# Patient Record
Sex: FEMALE | Race: WHITE | NOT HISPANIC OR LATINO | Employment: OTHER
[De-identification: names, ages, dates, MRNs, and addresses within clinical notes are randomized per-mention and may not be internally consistent; named-entity substitution may affect disease eponyms.]

---

## 2017-06-10 ENCOUNTER — HEALTH MAINTENANCE LETTER (OUTPATIENT)
Age: 57
End: 2017-06-10

## 2023-05-22 ASSESSMENT — ANXIETY QUESTIONNAIRES
GAD7 TOTAL SCORE: 4
IF YOU CHECKED OFF ANY PROBLEMS ON THIS QUESTIONNAIRE, HOW DIFFICULT HAVE THESE PROBLEMS MADE IT FOR YOU TO DO YOUR WORK, TAKE CARE OF THINGS AT HOME, OR GET ALONG WITH OTHER PEOPLE: NOT DIFFICULT AT ALL
GAD7 TOTAL SCORE: 4
2. NOT BEING ABLE TO STOP OR CONTROL WORRYING: NOT AT ALL
8. IF YOU CHECKED OFF ANY PROBLEMS, HOW DIFFICULT HAVE THESE MADE IT FOR YOU TO DO YOUR WORK, TAKE CARE OF THINGS AT HOME, OR GET ALONG WITH OTHER PEOPLE?: NOT DIFFICULT AT ALL
GAD7 TOTAL SCORE: 4
4. TROUBLE RELAXING: SEVERAL DAYS
7. FEELING AFRAID AS IF SOMETHING AWFUL MIGHT HAPPEN: NOT AT ALL
5. BEING SO RESTLESS THAT IT IS HARD TO SIT STILL: NOT AT ALL
6. BECOMING EASILY ANNOYED OR IRRITABLE: SEVERAL DAYS
3. WORRYING TOO MUCH ABOUT DIFFERENT THINGS: SEVERAL DAYS
1. FEELING NERVOUS, ANXIOUS, OR ON EDGE: SEVERAL DAYS
7. FEELING AFRAID AS IF SOMETHING AWFUL MIGHT HAPPEN: NOT AT ALL

## 2023-05-23 ENCOUNTER — TELEPHONE (OUTPATIENT)
Dept: FAMILY MEDICINE | Facility: CLINIC | Age: 63
End: 2023-05-23

## 2023-05-23 ENCOUNTER — VIRTUAL VISIT (OUTPATIENT)
Dept: FAMILY MEDICINE | Facility: CLINIC | Age: 63
End: 2023-05-23
Payer: COMMERCIAL

## 2023-05-23 DIAGNOSIS — E78.5 HYPERLIPIDEMIA LDL GOAL <70: ICD-10-CM

## 2023-05-23 DIAGNOSIS — F33.1 MODERATE EPISODE OF RECURRENT MAJOR DEPRESSIVE DISORDER (H): ICD-10-CM

## 2023-05-23 DIAGNOSIS — M81.6 LOCALIZED OSTEOPOROSIS WITHOUT CURRENT PATHOLOGICAL FRACTURE: ICD-10-CM

## 2023-05-23 DIAGNOSIS — I10 PRIMARY HYPERTENSION: ICD-10-CM

## 2023-05-23 DIAGNOSIS — E03.9 HYPOTHYROIDISM, UNSPECIFIED TYPE: Primary | ICD-10-CM

## 2023-05-23 DIAGNOSIS — E11.9 TYPE 2 DIABETES MELLITUS WITHOUT COMPLICATION, WITHOUT LONG-TERM CURRENT USE OF INSULIN (H): ICD-10-CM

## 2023-05-23 DIAGNOSIS — F41.1 GAD (GENERALIZED ANXIETY DISORDER): ICD-10-CM

## 2023-05-23 PROCEDURE — 99204 OFFICE O/P NEW MOD 45 MIN: CPT | Mod: VID | Performed by: INTERNAL MEDICINE

## 2023-05-23 RX ORDER — METFORMIN HCL 500 MG
500 TABLET, EXTENDED RELEASE 24 HR ORAL
Qty: 90 TABLET | Refills: 3 | Status: SHIPPED | OUTPATIENT
Start: 2023-05-23 | End: 2024-05-22

## 2023-05-23 RX ORDER — LANCETS
EACH MISCELLANEOUS
Qty: 100 EACH | Refills: 6 | Status: SHIPPED | OUTPATIENT
Start: 2023-05-23

## 2023-05-23 RX ORDER — LISINOPRIL/HYDROCHLOROTHIAZIDE 10-12.5 MG
1 TABLET ORAL DAILY
Qty: 90 TABLET | Refills: 3 | Status: SHIPPED | OUTPATIENT
Start: 2023-05-23 | End: 2024-05-22

## 2023-05-23 RX ORDER — CITALOPRAM HYDROBROMIDE 40 MG/1
40 TABLET ORAL DAILY
COMMUNITY
End: 2023-05-23

## 2023-05-23 RX ORDER — IBANDRONATE SODIUM 150 MG/1
150 TABLET, FILM COATED ORAL
Qty: 12 TABLET | Refills: 1 | Status: SHIPPED | OUTPATIENT
Start: 2023-05-23 | End: 2024-05-22

## 2023-05-23 RX ORDER — LEVOTHYROXINE SODIUM 100 UG/1
100 TABLET ORAL DAILY
Qty: 90 TABLET | Refills: 3 | Status: SHIPPED | OUTPATIENT
Start: 2023-05-23 | End: 2024-05-22

## 2023-05-23 RX ORDER — ATORVASTATIN CALCIUM 10 MG/1
10 TABLET, FILM COATED ORAL DAILY
Qty: 90 TABLET | Refills: 3 | Status: SHIPPED | OUTPATIENT
Start: 2023-05-23 | End: 2024-05-22

## 2023-05-23 RX ORDER — ATORVASTATIN CALCIUM 10 MG/1
10 TABLET, FILM COATED ORAL DAILY
COMMUNITY
End: 2023-05-23

## 2023-05-23 RX ORDER — METFORMIN HCL 500 MG
500 TABLET, EXTENDED RELEASE 24 HR ORAL
COMMUNITY
End: 2023-05-23

## 2023-05-23 RX ORDER — IBANDRONATE SODIUM 150 MG/1
150 TABLET, FILM COATED ORAL
COMMUNITY
End: 2023-05-23

## 2023-05-23 RX ORDER — CITALOPRAM HYDROBROMIDE 40 MG/1
40 TABLET ORAL DAILY
Qty: 90 TABLET | Refills: 3 | Status: SHIPPED | OUTPATIENT
Start: 2023-05-23 | End: 2024-09-18

## 2023-05-23 RX ORDER — LISINOPRIL/HYDROCHLOROTHIAZIDE 10-12.5 MG
1 TABLET ORAL DAILY
COMMUNITY
End: 2023-05-23

## 2023-05-23 ASSESSMENT — ANXIETY QUESTIONNAIRES: GAD7 TOTAL SCORE: 4

## 2023-05-23 NOTE — PATIENT INSTRUCTIONS
Purchase a home blood pressure cuff that  checks your blood pressure on your arm not  your wrist.     [employer] is a good brand. You can  check if the cuff you purchased is valid by going  to validateBP.org    Take your blood pressure after 5 minutes of rest  in the AM and PM for one week and record the  readings (14 total readings).    Send me a Showpad message with those  readings upon doing so.    For therapy:  [facility].    Check blood sugar readings 2 times a week, once in fasting (overnight)  And once 2 hours after meal.    Follow up in 1 month  Labs before follow up.

## 2023-05-23 NOTE — PROGRESS NOTES
Eli is a 62 year old who is being evaluated via a billable video visit.      How would you like to obtain your AVS? MyChart  If the video visit is dropped, the invitation should be resent by: Text to cell phone: [phone]  Will anyone else be joining your video visit? No        Assessment & Plan     Hypothyroidism, unspecified type  Stable.  Check TSH.  Levothyroxine refilled  - levothyroxine (SYNTHROID/LEVOTHROID) 100 MCG tablet; Take 1 tablet (100 mcg) by mouth daily  - TSH with free T4 reflex; Future    Primary hypertension  Stable.  Continue medication.  Discussed with patient to check blood pressure daily  - lisinopril-hydrochlorothiazide (ZESTORETIC) 10-12.5 MG tablet; Take 1 tablet by mouth daily    Type 2 diabetes mellitus without complication, without long-term current use of insulin (H)  Discussed with patient to check blood sugars 2-3 times a week.  Continue metformin.  - metFORMIN (GLUCOPHAGE XR) 500 MG 24 hr tablet; Take 1 tablet (500 mg) by mouth daily (with dinner)  - Hemoglobin A1c; Future  - PRIMARY CARE FOLLOW-UP SCHEDULING; Future  - PRIMARY CARE FOLLOW-UP SCHEDULING; Future  - blood glucose monitoring (NO BRAND SPECIFIED) meter device kit; Use to test blood sugar 2 times weekly. Preferred blood glucose meter OR supplies to accompany: Blood Glucose Monitor Brands: per insurance.  - blood glucose (NO BRAND SPECIFIED) test strip; Use to test blood sugar 2 times a week. To accompany: Blood Glucose Monitor Brands: per insurance.  - thin (NO BRAND SPECIFIED) lancets; Use with lanceting device. To accompany: Blood Glucose Monitor Brands: per insurance.    NENA (generalized anxiety disorder)  Stable.  Continue medication  - citalopram (CELEXA) 40 MG tablet; Take 1 tablet (40 mg) by mouth daily  - Adult Mental Health  Referral; Future    Moderate episode of recurrent major depressive disorder (H)  Stable.  Continue medication  - citalopram (CELEXA) 40 MG tablet; Take 1 tablet (40 mg) by mouth  daily  - Adult Mental Health  Referral; Future    Hyperlipidemia LDL goal <70  Stable.  Medication refilled.  - atorvastatin (LIPITOR) 10 MG tablet; Take 1 tablet (10 mg) by mouth daily  - Lipid Profile; Future    Localized osteoporosis without current pathological fracture  Continue medication  - IBANdronate (BONIVA) 150 MG tablet; Take 1 tablet (150 mg) by mouth every 30 days       See Patient Instructions    SOCORRO GROSS MD  [facility] ELENITA Benitez is a 62 year old, presenting for the following health issues:  No chief complaint on file.    Eli is a very pleasant 62 year old lady who presented to the clinic for follow up. She has type 2 DM, HTN, hypothyroidsim, NENA, depression, osteoporosis.   Moved from [facility] to [place] few months ago.  A1c not known, recently diagnosed DM  Her  was diagnosed with glioblastoma, epilepsy, dementia and she is his primary caregiver. She had a panic disorder. She does not have panic attacks. She doesn't have a therapist.    Needs care-everywhere access for records of screening tests and vaccines.     History of Present Illness       Mental Health Follow-up:  Patient presents to follow-up on Depression & Anxiety.Patient's depression since last visit has been:  Better  The patient is not having other symptoms associated with depression.  Patient's anxiety since last visit has been:  Medium  The patient is not having other symptoms associated with anxiety.  Any significant life events: financial concerns, housing concerns and grief or loss  Patient is not feeling anxious or having panic attacks.  Patient has no concerns about alcohol or drug use.    Diabetes:   She presents for follow up of diabetes.  She is not checking blood glucose. She has no concerns regarding her diabetes at this time.  She is not experiencing numbness or burning in feet, excessive thirst, blurry vision, weight changes or redness, sores or blisters on  feet.         Hyperlipidemia:  She presents for follow up of hyperlipidemia.  She is taking medication to lower cholesterol. She is not having myalgia or other side effects to statin medications.    Hypertension: She presents for follow up of hypertension.  She does not check blood pressure  regularly outside of the clinic. Outpatient blood pressures have not been over 140/90. She does not follow a low salt diet.     Hypothyroidism:     Since last visit, patient describes the following symptoms::  Anxiety, Depression, Dry skin and Loose stools    She eats 2-3 servings of fruits and vegetables daily.She consumes 0 sweetened beverage(s) daily.She exercises with enough effort to increase her heart rate 9 or less minutes per day.  She exercises with enough effort to increase her heart rate 3 or less days per week. She is missing 1 dose(s) of medications per week.  She is not taking prescribed medications regularly due to remembering to take.  Today's NENA-7 Score: 4      Review of Systems       Objective       Vitals:  No vitals were obtained today due to virtual visit.    Physical Exam   GEN: No acute distress  RESP: No audible increased work of breathing. Patient speaking in full sentences without distress.  PSYCH: pleasant  Exam otherwise limited due to virtual platform        Video-Visit Details    Type of service:  Video Visit     Originating Location (pt. Location): Home  Distant Location (provider location):  On-site  Platform used for Video Visit: Melia

## 2023-05-23 NOTE — TELEPHONE ENCOUNTER
Reason for Call:  Appointment Request    Patient requesting this type of appt: Chronic Diease Management/Medication/Follow-Up    Requested provider: Lisa Vanegas    Reason patient unable to be scheduled: Not within requested timeframe    When does patient want to be seen/preferred time: 1 month follow up     Comments: First available was many months out Dr. Vanegas wanted to see pt in 1 month     Could we send this information to you in [facility] or would you prefer to receive a phone call?:   Patient would prefer a phone call   Okay to leave a detailed message?: Yes at Cell number on file:    Telephone Information:   Mobile [phone]       Call taken on 5/23/2023 at 2:53 PM by Karmen Oviedo

## 2023-05-24 NOTE — TELEPHONE ENCOUNTER
Called and spoke to patient, helped scheduled 1 month follow up and lab appt per PCP.      Liudmila TAYLOR MA on 5/24/2023 at 9:32 AM

## 2023-06-01 ENCOUNTER — HEALTH MAINTENANCE LETTER (OUTPATIENT)
Age: 63
End: 2023-06-01

## 2023-06-09 ENCOUNTER — TELEPHONE (OUTPATIENT)
Dept: FAMILY MEDICINE | Facility: CLINIC | Age: 63
End: 2023-06-09

## 2023-06-09 NOTE — TELEPHONE ENCOUNTER
Patient Quality Outreach    Patient is due for the following:   Breast Cancer Screening - Mammogram    Next Steps:   No follow up needed at this time.    Type of outreach:    Sent 3KeyIt message.      Questions for provider review:    None           Rosa Murphy CMA

## 2023-06-27 ENCOUNTER — LAB (OUTPATIENT)
Dept: LAB | Facility: CLINIC | Age: 63
End: 2023-06-27
Payer: COMMERCIAL

## 2023-06-27 DIAGNOSIS — E78.5 HYPERLIPIDEMIA LDL GOAL <70: ICD-10-CM

## 2023-06-27 DIAGNOSIS — E03.9 HYPOTHYROIDISM, UNSPECIFIED TYPE: ICD-10-CM

## 2023-06-27 DIAGNOSIS — E11.9 TYPE 2 DIABETES MELLITUS WITHOUT COMPLICATION, WITHOUT LONG-TERM CURRENT USE OF INSULIN (H): ICD-10-CM

## 2023-06-27 LAB
CHOLEST SERPL-MCNC: 177 MG/DL
HBA1C MFR BLD: 6.2 % (ref 0–5.6)
HDLC SERPL-MCNC: 57 MG/DL
LDLC SERPL CALC-MCNC: 93 MG/DL
NONHDLC SERPL-MCNC: 120 MG/DL
TRIGL SERPL-MCNC: 135 MG/DL
TSH SERPL DL<=0.005 MIU/L-ACNC: 1.24 UIU/ML (ref 0.3–4.2)

## 2023-06-27 PROCEDURE — 83036 HEMOGLOBIN GLYCOSYLATED A1C: CPT

## 2023-06-27 PROCEDURE — 84443 ASSAY THYROID STIM HORMONE: CPT

## 2023-06-27 PROCEDURE — 36415 COLL VENOUS BLD VENIPUNCTURE: CPT

## 2023-06-27 PROCEDURE — 80061 LIPID PANEL: CPT

## 2023-10-03 ENCOUNTER — VIRTUAL VISIT (OUTPATIENT)
Dept: PSYCHOLOGY | Facility: CLINIC | Age: 63
End: 2023-10-03
Attending: INTERNAL MEDICINE
Payer: COMMERCIAL

## 2023-10-03 DIAGNOSIS — F43.23 ADJUSTMENT DISORDER WITH MIXED ANXIETY AND DEPRESSED MOOD: Primary | ICD-10-CM

## 2023-10-03 DIAGNOSIS — F41.1 GAD (GENERALIZED ANXIETY DISORDER): ICD-10-CM

## 2023-10-03 DIAGNOSIS — F33.1 MODERATE EPISODE OF RECURRENT MAJOR DEPRESSIVE DISORDER (H): ICD-10-CM

## 2023-10-03 PROCEDURE — 90791 PSYCH DIAGNOSTIC EVALUATION: CPT | Mod: 95

## 2023-10-03 ASSESSMENT — PATIENT HEALTH QUESTIONNAIRE - PHQ9
SUM OF ALL RESPONSES TO PHQ QUESTIONS 1-9: 5
10. IF YOU CHECKED OFF ANY PROBLEMS, HOW DIFFICULT HAVE THESE PROBLEMS MADE IT FOR YOU TO DO YOUR WORK, TAKE CARE OF THINGS AT HOME, OR GET ALONG WITH OTHER PEOPLE: SOMEWHAT DIFFICULT
SUM OF ALL RESPONSES TO PHQ QUESTIONS 1-9: 5

## 2023-10-03 NOTE — PROGRESS NOTES
"[facility] Counseling      PATIENT'S NAME: Eli Wall  PREFERRED NAME: Eli  PRONOUNS:     she her  MRN: [id number]  : 1960  ADDRESS: [id number] [place] [address] E  [address] [place], [place] [address]T. NUMBER:  [id number]  DATE OF SERVICE: 10/03/23  START TIME: 11:00 am  END TIME: 11:45 am  PREFERRED PHONE: [phone]  May we leave a program related message: Yes  SERVICE MODALITY:  Video Visit:      Provider verified identity through the following two step process.  Patient provided:  Patient address    Telemedicine Visit: The patient's condition can be safely assessed and treated via synchronous audio and visual telemedicine encounter.      Reason for Telemedicine Visit: Patient convenience (e.g. access to timely appointments / distance to available provider)    Originating Site (Patient Location): Patient's home    Distant Site (Provider Location): Provider Remote Setting- Home Office    Consent:  The patient/guardian has verbally consented to: the potential risks and benefits of telemedicine (video visit) versus in person care; bill my insurance or make self-payment for services provided; and responsibility for payment of non-covered services.     Patient would like the video invitation sent by:  My Chart    Mode of Communication:  Video Conference via [facility]    Distant Location (Provider):  Off-site    As the provider I attest to compliance with applicable laws and regulations related to telemedicine.    UNIVERSAL ADULT Mental Health DIAGNOSTIC ASSESSMENT    Identifying Information:  Patient is a 62 year old  individual.  Patient was referred for an assessment by primary care clinic.  Patient attended the session alone.    Chief Complaint:   The reason for seeking services at this time is: \"Grief counseling\".  The problem(s) began 18.   diagnosed with glioblastoma (brain cancer) - life expectancy was 18 months, has been living for 3 years. Caregiver stress,  now has " "dementia as well as epilepsy.    Patient has attempted to resolve these concerns in the past through moved near family from [place], left job-lost job due to caregiving .    Social/Family History:  Patient reported they grew up in [facility]  .  They were raised by biological parents  .  Parents were always together.  Patient reported that their childhood was mom was mentally ill-depression/anxiety, in and out of hospital for weeks at a time for shock treatments, heavily medicated.  Patient had to 'babysit' mom who was erratic and would wander. Got rebellious at 11, skipped school, did drugs, dropped out of school at 14, ended up with agoraphobia living with parents, acting as caregiver for mother, described it as a \"very nice [facility]\" got medication in late 30s, got a job, moved out and got ..  Patient described their current relationships with family of origin as parents . Brother Yon is 8 years older, not close. Noble, 2.5 years older-close relationship and a reason to move back to [place].     The patient describes their cultural background as .  Cultural influences and impact on patient's life structure, values, norms, and healthcare: None.  Contextual influences on patient's health include: Contextual Factors: Family Factors parent mental health issues (mom particularly), parentification, rebellion.  Living with parents, limited outside activities due to agoraphobia.  Negative self talk, resistant to medication/hospitalization due to mom's experience.  These factors will be addressed in the Preliminary Treatment plan. Patient identified their preferred language to be English. Patient reported they does not need the assistance of an  or other support involved in therapy.     Patient reported had no significant delays in developmental tasks.   Patient's highest education level was GED  .  Patient identified the following learning problems: none reported.  Modifications will not " be used to assist communication in therapy.  Patient reports they are not  able to understand written materials.    Patient reported the following relationship history dated through high school, did not date during agoraphobia, then dated a lot .  Patient's current relationship status is  in 2005.   Patient identified their sexual orientation as heterosexual.  Patient reported having  no  Patient identified siblings; spouse as part of their support system.  Patient identified the quality of these relationships as stable and meaningful  .      Patient's current living/housing situation involves staying in own home/apartment.  The immediate members of family and household include Sarkis Wall, 52,  and they report that housing is stable.    Patient is currently unemployed.  Patient reports their finances are obtained through SSDI disability. Patient does identify finances as a current stressor.      Patient reported that they have not been involved with the legal system.    Patient does not report being under probation/ parole/ jurisdiction. They are not under any current court jurisdiction.     Patient's Strengths and Limitations:  Patient identified the following strengths or resources that will help them succeed in treatment: exercise routine, family support, intelligence, and positive living community -apartment complex . Things that may interfere with the patient's success in treatment include: none identified.     Assessments:  The following assessments were completed by patient for this visit:  PHQ2:       5/22/2023     7:39 PM   PHQ-2 ( 1999 [employer])   Q1: Little interest or pleasure in doing things 0   Q2: Feeling down, depressed or hopeless 0   PHQ-2 Score 0   Q1: Little interest or pleasure in doing things Not at all   Q2: Feeling down, depressed or hopeless Not at all   PHQ-2 Score 0     PHQ9:       10/3/2023    10:50 AM   PHQ-9 SCORE   PHQ-9 Total Score MyChart 5 (Mild depression)   PHQ-9  Total Score 5     GAD2:       9/26/2023    11:52 AM   NENA-2   Feeling nervous, anxious, or on edge 1   Not being able to stop or control worrying 1   NENA-2 Total Score 2     GAD7:       5/22/2023     7:45 PM   NENA-7 SCORE   Total Score 4 (minimal anxiety)   Total Score 4     PROMIS 10-Global Health (only subscores and total score):       9/26/2023    11:53 AM   PROMIS-10 Scores Only   Global Mental Health Score 11   Global Physical Health Score 16   PROMIS TOTAL - SUBSCORES 27       Personal and Family Medical History:  Patient does report a family history of mental health concerns.  Patient reports family history includes Anxiety Disorder in her mother; Diabetes in her father; Hyperlipidemia in her brother; Mental Illness in her mother; Other Cancer in her father; Parkinsonism in her mother..     Patient does report Mental Health Diagnosis  agoraphobia .  Patient reported symptoms began at age 14.   Patient has received mental health services in the past:  group and individual therapy, no medication .  [facility]  Patient Patient reported the following previous diagnoses which include(s): lizations: None.  Patient denies a history of civil commitment.  Patient is receiving other mental health services.  These include none.       Patient has had a physical exam to rule out medical causes for current symptoms.  Date of last physical exam was within the past year. Client was encouraged to follow up with PCP if symptoms were to develop. The patient has a [facility] Provider, who is named Lisa Vanegas. Patient reports no current medical concerns. Patient denies any issues with pain.  There are significant appetite / nutritional concerns / weight changes.   Patient does not report a history of head injury / trauma / cognitive impairment.        Current Outpatient Medications:     atorvastatin (LIPITOR) 10 MG tablet, Take 1 tablet (10 mg) by mouth daily, Disp: 90 tablet, Rfl: 3     blood glucose (NO BRAND SPECIFIED) test strip, Use to test blood sugar 2 times a week. To accompany: Blood Glucose Monitor Brands: per insurance., Disp: 100 strip, Rfl: 6    blood glucose monitoring (NO BRAND SPECIFIED) meter device kit, Use to test blood sugar 2 times weekly. Preferred blood glucose meter OR supplies to accompany: Blood Glucose Monitor Brands: per insurance., Disp: 1 kit, Rfl: 0    citalopram (CELEXA) 40 MG tablet, Take 1 tablet (40 mg) by mouth daily, Disp: 90 tablet, Rfl: 3    IBANdronate (BONIVA) 150 MG tablet, Take 1 tablet (150 mg) by mouth every 30 days, Disp: 12 tablet, Rfl: 1    levothyroxine (SYNTHROID/LEVOTHROID) 100 MCG tablet, Take 1 tablet (100 mcg) by mouth daily, Disp: 90 tablet, Rfl: 3    lisinopril-hydrochlorothiazide (ZESTORETIC) 10-12.5 MG tablet, Take 1 tablet by mouth daily, Disp: 90 tablet, Rfl: 3    metFORMIN (GLUCOPHAGE XR) 500 MG 24 hr tablet, Take 1 tablet (500 mg) by mouth daily (with dinner), Disp: 90 tablet, Rfl: 3    thin (NO BRAND SPECIFIED) lancets, Use with lanceting device. To accompany: Blood Glucose Monitor Brands: per insurance., Disp: 100 each, Rfl: 6      Medication Adherence:  Patient reports taking.  .    Patient Allergies:    Allergies   Allergen Reactions    Sulfa Antibiotics        Medical History:    Past Medical History:   Diagnosis Date    Depressive disorder 1976    Depressive disorder, not elsewhere classified     Diabetes (H) 2022    Hypertension 2010    Unspecified hypothyroidism          Current Mental Status Exam:   Appearance:  Appropriate    Eye Contact:  Good   Psychomotor:  Normal       Gait / station:  no problem  Attitude / Demeanor: Interested  Speech      Rate / Production: Normal/ Responsive      Volume:  Normal  volume      Language:  intact  Mood:   Anxious  Sad   Affect:   Appropriate    Thought Content: Clear   Thought Process: Coherent       Associations: No loosening of associations  Insight:   Intellectual  Insight  Judgment:  Intact   Orientation:  All  Attention/concentration: Good    Substance Use:  Patient did not report a family history of substance use concerns; see medical history section for details.  Patient has not received chemical dependency treatment in the past.  Patient has not ever been to detox.      Patient is not currently receiving any chemical dependency treatment.           Substance History of use Age of first use Date of last use     Pattern and duration of use (include amounts and frequency)   Alcohol currently use   11 09/24/23 REPORTS SUBSTANCE USE: reports using substance 2 times per week and has 1 drink at a time.   Patient reports heaviest use was teen years.   Cannabis   used in the past 11 09/26/75 REPORTS SUBSTANCE USE: N/A     Amphetamines   never used     REPORTS SUBSTANCE USE: N/A   Cocaine/crack    never used       REPORTS SUBSTANCE USE: N/A   Hallucinogens used in the past   11 09/26/73  REPORTS SUBSTANCE USE: N/A   Inhalants never used         REPORTS SUBSTANCE USE: N/A   Heroin never used         REPORTS SUBSTANCE USE: N/A   Other Opiates never used     REPORTS SUBSTANCE USE: N/A   Benzodiazepine   never used     REPORTS SUBSTANCE USE: N/A   Barbiturates never used     REPORTS SUBSTANCE USE: N/A   Over the counter meds never used     REPORTS SUBSTANCE USE: N/A   Caffeine currently use 48   REPORTS SUBSTANCE USE: N/A   Nicotine  used in the past 11 12/26/88 REPORTS SUBSTANCE USE: N/A   Other substances not listed above:  Identify:  never used     REPORTS SUBSTANCE USE: N/A     Patient reported the following problems as a result of their substance use: no problems, not applicable.    Substance Use: No symptoms    Based on the negative CAGE score and clinical interview there  are not indications of drug or alcohol abuse.    Significant Losses / Trauma / Abuse / Neglect Issues:   Patient did not  serve in the .  There are indications or report of significant loss, trauma,  abuse or neglect issues related to:  loss of parents (mom Parkinson's) (dad 8 years later liver cancer), 's diagnosis of glioblastoma .  Neglect from mother due to mental illness  Concerns for possible neglect are not present.     Safety Assessment:   Patient denies current homicidal ideation and behaviors.  Patient denies current self-injurious ideation and behaviors.    Patient denied risk behaviors associated with substance use.  Patient denies any high risk behaviors associated with mental health symptoms.  Patient reports the following current concerns for their personal safety: None.  Patient reports there are not firearms in the house.       There are no firearms in the home..    History of Safety Concerns:  Patient denied a history of homicidal ideation.     Patient reported a history of personal safety concerns: hitchhiking, going to stranger's homes in teen years, abusing drugs, didn't care about consequences  Patient denied a history of assaultive behaviors.    Patient denied a history of sexual assault behaviors.     Patient reported a history of placing themselves in unsafe environment(s) associated with substance use.  Patient reported a history of high risk sexual behaviors  reported a history of engaging in illegal activities, such as shoplifiting reported a history of substance use partying in abandoned house  associated with mental health symptoms.  Patient reports the following protective factors: forward or future oriented thinking; dedication to family or friends; sense of belonging; purpose; secure attachment    Risk Plan:  See Recommendations for Safety and Risk Management Plan    Review of Symptoms per patient report:   Depression: Change in sleep, Excessive or inappropriate guilt, Change in energy level, Difficulties concentrating, Change in appetite, Low self-worth, Ruminations, Irritability, Feeling sad, down, or depressed, and Withdrawn  Peyton:  No Symptoms  Psychosis: No  Symptoms  Anxiety: Fears/phobias driving, water, heights  Panic:  No symptoms  Post Traumatic Stress Disorder:  No Symptoms   Eating Disorder: Carb driven meals,  sweets/baked goods for dessert  ADD / ADHD:  No symptoms  Conduct Disorder: No symptoms  Autism Spectrum Disorder: No symptoms  Obsessive Compulsive Disorder: No Symptoms    Patient reports the following compulsive behaviors and treatment history:  none .      Diagnostic Criteria:   Adjustment Disorder  A. The development of emotional or behavioral symptoms in response to an identifiable stressor(s) occurring within 3 months of the onset of the stressor(s)  B. These symptoms or behaviors are clinically significant, as evidenced by one or both of the following:       - Marked distress that is out of proportion to the severity/intensity of the stressor (with consideration for external context & culture)       - Significant impairment in social, occupational, or other important areas of functioning  C. The stress-related disturbance does not meet criteria for another disorder & is not not an exacerbation of another mental disorder  D. The symptoms do not represent normal bereavement  E. Once the stressor or its consequences have terminated, the symptoms do not persist for more than an additional 6 months       * Adjustment Disorder with Mixed Anxiety and Depressed Mood: The predominant manifestation is a combination of depression and anxiety    Functional Status:  Patient reports the following functional impairments:  chronic disease management, health maintenance, and home life with -caregiver .     Nonprogrammatic care:  Patient is requesting basic services to address current mental health concerns.    Clinical Summary:  1. Reason for assessment: grief-caregiver stress, low mood  .  2. Psychosocial, Cultural and Contextual Factors: hx of agoraphobia, parentification  .  3. Principal DSM5 Diagnoses  (Sustained by DSM5 Criteria Listed Above):    Adjustment Disorders  309.28 (F43.23) With mixed anxiety and depressed mood.  4. Other Diagnoses that is relevant to services:   .  5. Provisional Diagnosis:  Adjustment Disorders  309.28 (F43.23) With mixed anxiety and depressed mood as evidenced by ROS .  6. Prognosis: Relieve Acute Symptoms.  7. Likely consequences of symptoms if not treated: further decompensation.  8. Client strengths include:  insightful, intelligent, and support of family, friends and providers .     Recommendations:     1. Plan for Safety and Risk Management:   Safety and Risk: Recommended that patient call 911 or go to the local ED should there be a change in any of these risk factors..          Report to child / adult protection services was NA.     2. Patient's identified  history of caregiving and its effect on functioning will be included in therapy .     3. Initial Treatment will focus on:    Adjustment Difficulties related to: family concerns.     4. Resources/Service Plan:    services are not indicated.   Modifications to assist communication are not indicated.   Additional disability accommodations are not indicated.      5. Collaboration:   Collaboration / coordination of treatment will be initiated with the following  support professionals: primary care physician.      6.  Referrals:   The following referral(s) will be initiated:  potentially 55 plus program . Next Scheduled Appointment: 11/1/23.      A Release of Information has been obtained for the following:  n/a .     Clinical Substantiation/medical necessity for the above recommendations:  left untreated, patient may experience symptom increase that could further interfere with functioning.    7. DIANA:    DIANA:  Discussed the general effects of drugs and alcohol on health and well-being. Provider gave patient printed information about the  effects of chemical use on their health and well being. Recommendations:  n/a .     8. Records:   These were reviewed at time of  assessment.   Information in this assessment was obtained from the medical record and  provided by patient who is a fair historian.    Patient will have open access to their mental health medical record.    9.   Interactive Complexity: No    10. Safety Plan:  Patient denied any current/recent/lifetime history of suicidal ideation and/or behaviors.  No safety plan indicated at this time.     Provider Name/ Credentials:  Yadira SUGGS LICSW  October 3, 2023

## 2023-10-29 ENCOUNTER — HEALTH MAINTENANCE LETTER (OUTPATIENT)
Age: 63
End: 2023-10-29

## 2023-11-01 ENCOUNTER — VIRTUAL VISIT (OUTPATIENT)
Dept: PSYCHOLOGY | Facility: CLINIC | Age: 63
End: 2023-11-01
Payer: MEDICARE

## 2023-11-01 DIAGNOSIS — F43.23 ADJUSTMENT DISORDER WITH MIXED ANXIETY AND DEPRESSED MOOD: Primary | ICD-10-CM

## 2023-11-01 PROCEDURE — 90834 PSYTX W PT 45 MINUTES: CPT | Mod: VID

## 2023-11-01 NOTE — PROGRESS NOTES
[facility]                                     Progress Note    Patient Name: Eli Wall  Date: 11/1/23         Service Type: Individual      Session Start Time: 10:05 am  Session End Time: 10:50 am     Session Length: 45 min    Session #: 2    Attendees: Client attended alone    Service Modality:  Video Visit:      Provider verified identity through the following two step process.  Patient provided:  Patient is known previously to provider    Telemedicine Visit: The patient's condition can be safely assessed and treated via synchronous audio and visual telemedicine encounter.      Reason for Telemedicine Visit: Patient convenience (e.g. access to timely appointments / distance to available provider)    Originating Site (Patient Location): Patient's home    Distant Site (Provider Location): Provider Remote Setting- Home Office    Consent:  The patient/guardian has verbally consented to: the potential risks and benefits of telemedicine (video visit) versus in person care; bill my insurance or make self-payment for services provided; and responsibility for payment of non-covered services.     Patient would like the video invitation sent by:  My Chart    Mode of Communication:  Video Conference via [facility]    Distant Location (Provider):  Off-site    As the provider I attest to compliance with applicable laws and regulations related to telemedicine.    DATA  Interactive Complexity: No  Crisis: No        Progress Since Last Session (Related to Symptoms / Goals / Homework):   Symptoms: No change      Homework:  was not assigned      Episode of Care Goals: No improvement - PRECONTEMPLATION (Not seeing need for change); Intervened by educating the patient about the effects of current behavior on health.  Evoked information about reasons to continue behavior, express concern / recommendations, and explored any change talk     Current / Ongoing Stressors and Concerns:   had a 'minor' seizure.  Patient made an estate planning appointment. Reconnecting with estranged brother who had a history of anger with her now  mother, radical political beliefs.  Living in a seniors apartment in a new area of [facility].      Treatment Objective(s) Addressed in This Session:   participate in 1-2 activities to improve mood       Intervention:   Solution Focused: surfaced lack of consistent contact with most family in the area  Motivational Interviewing  Target Behavior: disease management/lifestyle changes walking/fitness center    Stage of Change: PRECONTEMPLATION (Not seeing need for change)    MI Intervention: Expressed Empathy/Understanding     Change Talk Expressed by the Patient: NA - Precontemplative    Provider Response to Change Talk: E - Evoked more info from patient about behavior change      Assessments completed prior to visit:  The following assessments were completed by patient for this visit:  PHQ2:       2023     7:39 PM   PHQ-2 (  [employer])   Q1: Little interest or pleasure in doing things 0   Q2: Feeling down, depressed or hopeless 0   PHQ-2 Score 0   Q1: Little interest or pleasure in doing things Not at all   Q2: Feeling down, depressed or hopeless Not at all   PHQ-2 Score 0     PHQ9:       10/3/2023    10:50 AM   PHQ-9 SCORE   PHQ-9 Total Score MyChart 5 (Mild depression)   PHQ-9 Total Score 5     GAD2:       2023    11:52 AM 10/25/2023    12:48 PM   NENA-2   Feeling nervous, anxious, or on edge 1 1   Not being able to stop or control worrying 1 0   NENA-2 Total Score 2 1     GAD7:       2023     7:45 PM   NENA-7 SCORE   Total Score 4 (minimal anxiety)   Total Score 4     CAGE-AID:       2023    11:54 AM   CAGE-AID Total Score   Total Score 0   Total Score MyChart 0 (A total score of 2 or greater is considered clinically significant)     PROMIS 10-Global Health (only subscores and total score):       2023    11:53 AM   PROMIS-10 Scores Only   Global Mental Health Score 11    Global Physical Health Score 16   PROMIS TOTAL - SUBSCORES 27     [place] Suicide Severity Rating Scale (Lifetime/Recent)       No data to display                  ASSESSMENT: Current Emotional / Mental Status (status of significant symptoms):   Risk status (Self / Other harm or suicidal ideation)   Patient denies current fears or concerns for personal safety.   Patient denies current or recent suicidal ideation or behaviors.   Patient denies current or recent homicidal ideation or behaviors.   Patient denies current or recent self injurious behavior or ideation.   Patient denies other safety concerns.   Patient reports there has been no change in risk factors since their last session.     Patient reports there has been no change in protective factors since their last session.     Recommended that patient call 911 or go to the local ED should there be a change in any of these risk factors.     Appearance:   Appropriate    Eye Contact:   Good    Psychomotor Behavior: Normal    Attitude:   Cooperative  Pleasant   Orientation:   All   Speech    Rate / Production: Normal     Volume:  Normal    Mood:    Depressed    Affect:    Appropriate    Thought Content:  Clear    Thought Form:  Coherent  Logical    Insight:    Good      Medication Review:     Current Outpatient Medications:     atorvastatin (LIPITOR) 10 MG tablet, Take 1 tablet (10 mg) by mouth daily, Disp: 90 tablet, Rfl: 3    blood glucose (NO BRAND SPECIFIED) test strip, Use to test blood sugar 2 times a week. To accompany: Blood Glucose Monitor Brands: per insurance., Disp: 100 strip, Rfl: 6    blood glucose monitoring (NO BRAND SPECIFIED) meter device kit, Use to test blood sugar 2 times weekly. Preferred blood glucose meter OR supplies to accompany: Blood Glucose Monitor Brands: per insurance., Disp: 1 kit, Rfl: 0    citalopram (CELEXA) 40 MG tablet, Take 1 tablet (40 mg) by mouth daily, Disp: 90 tablet, Rfl: 3    IBANdronate (BONIVA) 150 MG tablet, Take 1  tablet (150 mg) by mouth every 30 days, Disp: 12 tablet, Rfl: 1    levothyroxine (SYNTHROID/LEVOTHROID) 100 MCG tablet, Take 1 tablet (100 mcg) by mouth daily, Disp: 90 tablet, Rfl: 3    lisinopril-hydrochlorothiazide (ZESTORETIC) 10-12.5 MG tablet, Take 1 tablet by mouth daily, Disp: 90 tablet, Rfl: 3    metFORMIN (GLUCOPHAGE XR) 500 MG 24 hr tablet, Take 1 tablet (500 mg) by mouth daily (with dinner), Disp: 90 tablet, Rfl: 3    thin (NO BRAND SPECIFIED) lancets, Use with lanceting device. To accompany: Blood Glucose Monitor Brands: per insurance., Disp: 100 each, Rfl: 6       Medication Compliance:   Yes     Changes in Health Issues:   None reported     Chemical Use Review:   Substance Use: Chemical use reviewed, no active concerns identified      Tobacco Use: No current tobacco use.      Diagnosis:  1. Adjustment disorder with mixed anxiety and depressed mood      Collateral Reports Completed:   Not Applicable    PLAN: (Patient Tasks / Therapist Tasks / Other)  Consider in building activities-fitness center, walking around building.        Yadira Etienne, [facility] 11/1/2023                                                           ______________________________________________________________________

## 2023-11-14 ASSESSMENT — PATIENT HEALTH QUESTIONNAIRE - PHQ9
SUM OF ALL RESPONSES TO PHQ QUESTIONS 1-9: 2
10. IF YOU CHECKED OFF ANY PROBLEMS, HOW DIFFICULT HAVE THESE PROBLEMS MADE IT FOR YOU TO DO YOUR WORK, TAKE CARE OF THINGS AT HOME, OR GET ALONG WITH OTHER PEOPLE: NOT DIFFICULT AT ALL
SUM OF ALL RESPONSES TO PHQ QUESTIONS 1-9: 2

## 2023-11-15 ENCOUNTER — VIRTUAL VISIT (OUTPATIENT)
Dept: PSYCHOLOGY | Facility: CLINIC | Age: 63
End: 2023-11-15
Payer: MEDICARE

## 2023-11-15 DIAGNOSIS — F43.23 ADJUSTMENT DISORDER WITH MIXED ANXIETY AND DEPRESSED MOOD: Primary | ICD-10-CM

## 2023-11-15 PROCEDURE — 90834 PSYTX W PT 45 MINUTES: CPT | Mod: VID

## 2023-11-15 NOTE — PROGRESS NOTES
[facility]                                     Progress Note    Patient Name: Eli Wall  Date: 11/15/23         Service Type: Individual      Session Start Time: 10:00 am  Session End Time: 10:45 am     Session Length: 45 min    Session #: 3    Attendees: Client attended alone    Service Modality:  Video Visit:      Provider verified identity through the following two step process.  Patient provided:  Patient is known previously to provider    Telemedicine Visit: The patient's condition can be safely assessed and treated via synchronous audio and visual telemedicine encounter.      Reason for Telemedicine Visit: Patient convenience (e.g. access to timely appointments / distance to available provider)    Originating Site (Patient Location): Patient's home    Distant Site (Provider Location): Provider Remote Setting- Home Office    Consent:  The patient/guardian has verbally consented to: the potential risks and benefits of telemedicine (video visit) versus in person care; bill my insurance or make self-payment for services provided; and responsibility for payment of non-covered services.     Patient would like the video invitation sent by:  My Chart    Mode of Communication:  Video Conference via [facility]    Distant Location (Provider):  Off-site    As the provider I attest to compliance with applicable laws and regulations related to telemedicine.    DATA  Interactive Complexity: No  Crisis: No        Progress Since Last Session (Related to Symptoms / Goals / Homework):   Symptoms: No change      Homework: Achieved / completed to satisfaction      Episode of Care Goals: Minimal progress - CONTEMPLATION (Considering change and yet undecided); Intervened by assessing the negative and positive thinking (ambivalence) about behavior change     Current / Ongoing Stressors and Concerns:   had some positive health news, continuing dementia. Interest and fear of getting some employment.  Noticing a reduction in fear, ability to function. Patient made an estate planning appointment. Reconnecting with estranged brother who had a history of anger with her now  mother, radical political beliefs.  Living in a seniors apartment in a [facility] area [facility].      Treatment Objective(s) Addressed in This Session:   participate in 1-2 activities to improve mood       Intervention:   Solution Focused: surfaced lack of consistent contact with most family in the area, desire for contact   Explored sense of guilt regarding not being employed, caregiver stress  Motivational Interviewing  Target Behavior: disease management/lifestyle changes walking/fitness center, reading/entertainment options    Stage of Change: PREPARATION (Decided to change - considering how)    MI Intervention: Expressed Empathy/Understanding, Open-ended questions, and Reflections: simple and complex     Change Talk Expressed by the Patient: Reasons to change Taking steps    Provider Response to Change Talk: E - Evoked more info from patient about behavior change and A - Affirmed patient's thoughts, decisions, or attempts at behavior change      Assessments completed prior to visit:  The following assessments were completed by patient for this visit:  PHQ2:       2023     7:39 PM   PHQ-2 ( 1999 [employer])   Q1: Little interest or pleasure in doing things 0   Q2: Feeling down, depressed or hopeless 0   PHQ-2 Score 0   Q1: Little interest or pleasure in doing things Not at all   Q2: Feeling down, depressed or hopeless Not at all   PHQ-2 Score 0     PHQ9:       10/3/2023    10:50 AM 2023    10:55 PM   PHQ-9 SCORE   PHQ-9 Total Score MyChart 5 (Mild depression) 2 (Minimal depression)   PHQ-9 Total Score 5 2     GAD2:       2023    11:52 AM 10/25/2023    12:48 PM 2023    10:58 AM   NENA-2   Feeling nervous, anxious, or on edge 1 1 0   Not being able to stop or control worrying 1 0 0   NENA-2 Total Score 2 1 0     GAD7:        5/22/2023     7:45 PM   NENA-7 SCORE   Total Score 4 (minimal anxiety)   Total Score 4     CAGE-AID:       9/26/2023    11:54 AM   CAGE-AID Total Score   Total Score 0   Total Score MyChart 0 (A total score of 2 or greater is considered clinically significant)     PROMIS 10-Global Health (only subscores and total score):       9/26/2023    11:53 AM   PROMIS-10 Scores Only   Global Mental Health Score 11   Global Physical Health Score 16   PROMIS TOTAL - SUBSCORES 27     [place] Suicide Severity Rating Scale (Lifetime/Recent)       No data to display                  ASSESSMENT: Current Emotional / Mental Status (status of significant symptoms):   Risk status (Self / Other harm or suicidal ideation)   Patient denies current fears or concerns for personal safety.   Patient denies current or recent suicidal ideation or behaviors.   Patient denies current or recent homicidal ideation or behaviors.   Patient denies current or recent self injurious behavior or ideation.   Patient denies other safety concerns.   Patient reports there has been no change in risk factors since their last session.     Patient reports there has been no change in protective factors since their last session.     Recommended that patient call 911 or go to the local ED should there be a change in any of these risk factors.     Appearance:   Appropriate    Eye Contact:   Good    Psychomotor Behavior: Normal    Attitude:   Cooperative  Pleasant   Orientation:   All   Speech    Rate / Production: Normal     Volume:  Normal    Mood:    Depressed    Affect:    Appropriate    Thought Content:  Clear    Thought Form:  Coherent  Logical    Insight:    Good      Medication Review:     Current Outpatient Medications:     atorvastatin (LIPITOR) 10 MG tablet, Take 1 tablet (10 mg) by mouth daily, Disp: 90 tablet, Rfl: 3    blood glucose (NO BRAND SPECIFIED) test strip, Use to test blood sugar 2 times a week. To accompany: Blood Glucose Monitor [employer]ands: per  insurance., Disp: 100 strip, Rfl: 6    blood glucose monitoring (NO BRAND SPECIFIED) meter device kit, Use to test blood sugar 2 times weekly. Preferred blood glucose meter OR supplies to accompany: Blood Glucose Monitor Brands: per insurance., Disp: 1 kit, Rfl: 0    citalopram (CELEXA) 40 MG tablet, Take 1 tablet (40 mg) by mouth daily, Disp: 90 tablet, Rfl: 3    IBANdronate (BONIVA) 150 MG tablet, Take 1 tablet (150 mg) by mouth every 30 days, Disp: 12 tablet, Rfl: 1    levothyroxine (SYNTHROID/LEVOTHROID) 100 MCG tablet, Take 1 tablet (100 mcg) by mouth daily, Disp: 90 tablet, Rfl: 3    lisinopril-hydrochlorothiazide (ZESTORETIC) 10-12.5 MG tablet, Take 1 tablet by mouth daily, Disp: 90 tablet, Rfl: 3    metFORMIN (GLUCOPHAGE XR) 500 MG 24 hr tablet, Take 1 tablet (500 mg) by mouth daily (with dinner), Disp: 90 tablet, Rfl: 3    thin (NO BRAND SPECIFIED) lancets, Use with lanceting device. To accompany: Blood Glucose Monitor Brands: per insurance., Disp: 100 each, Rfl: 6       Medication Compliance:   Yes     Changes in Health Issues:   None reported     Chemical Use Review:   Substance Use: Chemical use reviewed, no active concerns identified      Tobacco Use: No current tobacco use.      Diagnosis:  1. Adjustment disorder with mixed anxiety and depressed mood        Collateral Reports Completed:   Not Applicable    PLAN: (Patient Tasks / Therapist Tasks / Other)  Consider in building activities-fitness center, walking around building. Engage in entertainment that is soothing over triggering        Yadira Etienne, [facility] 11/15/2023                                                           ______________________________________________________________________

## 2023-12-13 ENCOUNTER — VIRTUAL VISIT (OUTPATIENT)
Dept: PSYCHOLOGY | Facility: CLINIC | Age: 63
End: 2023-12-13
Payer: MEDICARE

## 2023-12-13 DIAGNOSIS — F43.23 ADJUSTMENT DISORDER WITH MIXED ANXIETY AND DEPRESSED MOOD: Primary | ICD-10-CM

## 2023-12-13 PROCEDURE — 90837 PSYTX W PT 60 MINUTES: CPT | Mod: VID

## 2023-12-13 NOTE — PROGRESS NOTES
[facility]                                     Progress Note    Patient Name: Eli Wall  Date: 12/13/23         Service Type: Individual      Session Start Time: 10:00 am  Session End Time: 10:53 am     Session Length: 53 min    Session #: 4    Attendees: Client attended alone    Service Modality:  Video Visit:      Provider verified identity through the following two step process.  Patient provided:  Patient is known previously to provider    Telemedicine Visit: The patient's condition can be safely assessed and treated via synchronous audio and visual telemedicine encounter.      Reason for Telemedicine Visit: Patient convenience (e.g. access to timely appointments / distance to available provider)    Originating Site (Patient Location): Patient's home    Distant Site (Provider Location): Provider Remote Setting- Home Office    Consent:  The patient/guardian has verbally consented to: the potential risks and benefits of telemedicine (video visit) versus in person care; bill my insurance or make self-payment for services provided; and responsibility for payment of non-covered services.     Patient would like the video invitation sent by:  My Chart    Mode of Communication:  Video Conference via [facility]    Distant Location (Provider):  Off-site    As the provider I attest to compliance with applicable laws and regulations related to telemedicine.    DATA  Interactive Complexity: No  Crisis: No        Progress Since Last Session (Related to Symptoms / Goals / Homework):   Symptoms: No change      Homework: Achieved / completed to satisfaction      Episode of Care Goals: Minimal progress - CONTEMPLATION (Considering change and yet undecided); Intervened by assessing the negative and positive thinking (ambivalence) about behavior change     Current / Ongoing Stressors and Concerns:   had seizure, continuing dementia. Interest and fear of getting some employment. Noticing a reduction in  fear, ability to function. Reconnecting with estranged brother who had a history of anger with her now  mother.  Living in a seniors apartment in a new area of [facility].      Treatment Objective(s) Addressed in This Session:   participate in 1-2 activities to improve mood       Intervention:   Solution Focused: surfaced lack of consistent contact with most family in the area, desire for contact   Explored sense of guilt regarding not being employed, caregiver stress. Introduced cognitive distortions, reflected on   Motivational Interviewing  Target Behavior: disease management/lifestyle changes walking/fitness center, reading/entertainment options    Stage of Change: PREPARATION (Decided to change - considering how)    MI Intervention: Expressed Empathy/Understanding, Open-ended questions, and Reflections: simple and complex     Change Talk Expressed by the Patient: Reasons to change Taking steps    Provider Response to Change Talk: E - Evoked more info from patient about behavior change and A - Affirmed patient's thoughts, decisions, or attempts at behavior change      Assessments completed prior to visit:  The following assessments were completed by patient for this visit:  PHQ2:       2023     7:39 PM   PHQ-2 (  [employer])   Q1: Little interest or pleasure in doing things 0   Q2: Feeling down, depressed or hopeless 0   PHQ-2 Score 0   Q1: Little interest or pleasure in doing things Not at all   Q2: Feeling down, depressed or hopeless Not at all   PHQ-2 Score 0     PHQ9:       10/3/2023    10:50 AM 2023    10:55 PM   PHQ-9 SCORE   PHQ-9 Total Score MyChart 5 (Mild depression) 2 (Minimal depression)   PHQ-9 Total Score 5 2     GAD2:       2023    11:52 AM 10/25/2023    12:48 PM 2023    10:58 AM 2023     9:50 AM   NENA-2   Feeling nervous, anxious, or on edge 1 1 0 1   Not being able to stop or control worrying 1 0 0 0   NENA-2 Total Score 2 1 0 1     GAD7:       2023     7:45 PM    NENA-7 SCORE   Total Score 4 (minimal anxiety)   Total Score 4     CAGE-AID:       9/26/2023    11:54 AM   CAGE-AID Total Score   Total Score 0   Total Score MyChart 0 (A total score of 2 or greater is considered clinically significant)     PROMIS 10-Global Health (only subscores and total score):       9/26/2023    11:53 AM   PROMIS-10 Scores Only   Global Mental Health Score 11   Global Physical Health Score 16   PROMIS TOTAL - SUBSCORES 27     [place] Suicide Severity Rating Scale (Lifetime/Recent)       No data to display                  ASSESSMENT: Current Emotional / Mental Status (status of significant symptoms):   Risk status (Self / Other harm or suicidal ideation)   Patient denies current fears or concerns for personal safety.   Patient denies current or recent suicidal ideation or behaviors.   Patient denies current or recent homicidal ideation or behaviors.   Patient denies current or recent self injurious behavior or ideation.   Patient denies other safety concerns.   Patient reports there has been no change in risk factors since their last session.     Patient reports there has been no change in protective factors since their last session.     Recommended that patient call 911 or go to the local ED should there be a change in any of these risk factors.     Appearance:   Appropriate    Eye Contact:   Good    Psychomotor Behavior: Normal    Attitude:   Cooperative  Pleasant   Orientation:   All   Speech    Rate / Production: Normal     Volume:  Normal    Mood:    Depressed    Affect:    Appropriate    Thought Content:  Clear    Thought Form:  Coherent  Logical    Insight:    Good      Medication Review:     Current Outpatient Medications:     atorvastatin (LIPITOR) 10 MG tablet, Take 1 tablet (10 mg) by mouth daily, Disp: 90 tablet, Rfl: 3    blood glucose (NO BRAND SPECIFIED) test strip, Use to test blood sugar 2 times a week. To accompany: Blood Glucose Monitor [employer]: per insurance., Disp: 100  strip, Rfl: 6    blood glucose monitoring (NO BRAND SPECIFIED) meter device kit, Use to test blood sugar 2 times weekly. Preferred blood glucose meter OR supplies to accompany: Blood Glucose Monitor [employer]: per insurance., Disp: 1 kit, Rfl: 0    citalopram (CELEXA) 40 MG tablet, Take 1 tablet (40 mg) by mouth daily, Disp: 90 tablet, Rfl: 3    IBANdronate (BONIVA) 150 MG tablet, Take 1 tablet (150 mg) by mouth every 30 days, Disp: 12 tablet, Rfl: 1    levothyroxine (SYNTHROID/LEVOTHROID) 100 MCG tablet, Take 1 tablet (100 mcg) by mouth daily, Disp: 90 tablet, Rfl: 3    lisinopril-hydrochlorothiazide (ZESTORETIC) 10-12.5 MG tablet, Take 1 tablet by mouth daily, Disp: 90 tablet, Rfl: 3    metFORMIN (GLUCOPHAGE XR) 500 MG 24 hr tablet, Take 1 tablet (500 mg) by mouth daily (with dinner), Disp: 90 tablet, Rfl: 3    thin (NO BRAND SPECIFIED) lancets, Use with lanceting device. To accompany: Blood Glucose Monitor Brands: per insurance., Disp: 100 each, Rfl: 6       Medication Compliance:   Yes     Changes in Health Issues:   None reported     Chemical Use Review:   Substance Use: Chemical use reviewed, no active concerns identified      Tobacco Use: No current tobacco use.      Diagnosis:  1. Adjustment disorder with mixed anxiety and depressed mood      Collateral Reports Completed:   Not Applicable    PLAN: (Patient Tasks / Therapist Tasks / Other)  Consider in building activities-fitness center, walking around building. Engage in entertainment that is soothing over triggering. Notice distorted thoughts.     Yadira Etienne, [facility] 12/13/2023    _________________________________________________________

## 2024-01-07 ENCOUNTER — HEALTH MAINTENANCE LETTER (OUTPATIENT)
Age: 64
End: 2024-01-07

## 2024-01-14 ASSESSMENT — PATIENT HEALTH QUESTIONNAIRE - PHQ9
10. IF YOU CHECKED OFF ANY PROBLEMS, HOW DIFFICULT HAVE THESE PROBLEMS MADE IT FOR YOU TO DO YOUR WORK, TAKE CARE OF THINGS AT HOME, OR GET ALONG WITH OTHER PEOPLE: SOMEWHAT DIFFICULT
SUM OF ALL RESPONSES TO PHQ QUESTIONS 1-9: 3
SUM OF ALL RESPONSES TO PHQ QUESTIONS 1-9: 3

## 2024-01-15 ENCOUNTER — VIRTUAL VISIT (OUTPATIENT)
Dept: PSYCHOLOGY | Facility: CLINIC | Age: 64
End: 2024-01-15
Payer: MEDICARE

## 2024-01-15 DIAGNOSIS — F43.23 ADJUSTMENT DISORDER WITH MIXED ANXIETY AND DEPRESSED MOOD: Primary | ICD-10-CM

## 2024-01-15 PROCEDURE — 90837 PSYTX W PT 60 MINUTES: CPT | Mod: 95

## 2024-01-15 NOTE — PROGRESS NOTES
[facility]                                     Progress Note    Patient Name: Eli Wall  Date: 1/15/24         Service Type: Individual      Session Start Time: 2:00 pm  Session End Time: 2:53 pm     Session Length: 53 min    Session #: 5    Attendees: Client attended alone    Service Modality:  Video Visit:      Provider verified identity through the following two step process.  Patient provided:  Patient is known previously to provider    Telemedicine Visit: The patient's condition can be safely assessed and treated via synchronous audio and visual telemedicine encounter.      Reason for Telemedicine Visit: Patient convenience (e.g. access to timely appointments / distance to available provider)    Originating Site (Patient Location): Patient's home    Distant Site (Provider Location): Provider Remote Setting- Home Office    Consent:  The patient/guardian has verbally consented to: the potential risks and benefits of telemedicine (video visit) versus in person care; bill my insurance or make self-payment for services provided; and responsibility for payment of non-covered services.     Patient would like the video invitation sent by:  My Chart    Mode of Communication:  Video Conference via Amwell    Distant Location (Provider):  Off-site    As the provider I attest to compliance with applicable laws and regulations related to telemedicine.    DATA  Interactive Complexity: No  Crisis: No        Progress Since Last Session (Related to Symptoms / Goals / Homework):   Symptoms: No change      Homework: Achieved / completed to satisfaction      Episode of Care Goals: Minimal progress - CONTEMPLATION (Considering change and yet undecided); Intervened by assessing the negative and positive thinking (ambivalence) about behavior change     Current / Ongoing Stressors and Concerns:   had seizure, continuing dementia. Interest and fear of getting some employment. Noticing a reduction in  fear, ability to function. Reconnecting with estranged brother who had a history of anger with her now  mother.  Living in a seniors apartment in a new area of [facility].      Treatment Objective(s) Addressed in This Session:   participate in 1-2 activities to improve mood       Intervention:   Solution Focused: surfaced lack of consistent contact with most family in the area, desire for contact   Used interactive adult visualization in ssession    Motivational Interviewing  Target Behavior: disease management/lifestyle changes walking/fitness center, reading/entertainment options    Stage of Change: PREPARATION (Decided to change - considering how)    MI Intervention: Expressed Empathy/Understanding, Open-ended questions, and Reflections: simple and complex     Change Talk Expressed by the Patient: Reasons to change Taking steps    Provider Response to Change Talk: E - Evoked more info from patient about behavior change and A - Affirmed patient's thoughts, decisions, or attempts at behavior change      Assessments completed prior to visit:  The following assessments were completed by patient for this visit:  PHQ2:       2023     7:39 PM   PHQ-2 (  [employer])   Q1: Little interest or pleasure in doing things 0   Q2: Feeling down, depressed or hopeless 0   PHQ-2 Score 0   Q1: Little interest or pleasure in doing things Not at all   Q2: Feeling down, depressed or hopeless Not at all   PHQ-2 Score 0     PHQ9:       10/3/2023    10:50 AM 2023    10:55 PM 2024     9:48 AM   PHQ-9 SCORE   PHQ-9 Total Score MyChart 5 (Mild depression) 2 (Minimal depression) 3 (Minimal depression)   PHQ-9 Total Score 5 2 3     GAD2:       2023    11:52 AM 10/25/2023    12:48 PM 2023    10:58 AM 2023     9:50 AM 2024     9:29 AM   NENA-2   Feeling nervous, anxious, or on edge 1 1 0 1 0   Not being able to stop or control worrying 1 0 0 0 0   NENA-2 Total Score 2 1 0 1 0     GAD7:       2023     7:45  PM   NENA-7 SCORE   Total Score 4 (minimal anxiety)   Total Score 4     CAGE-AID:       9/26/2023    11:54 AM   CAGE-AID Total Score   Total Score 0   Total Score MyChart 0 (A total score of 2 or greater is considered clinically significant)     PROMIS 10-Global Health (only subscores and total score):       9/26/2023    11:53 AM 1/8/2024     9:31 AM   PROMIS-10 Scores Only   Global Mental Health Score 11 11   Global Physical Health Score 16 17   PROMIS TOTAL - SUBSCORES 27 28     [place] Suicide Severity Rating Scale (Lifetime/Recent)       No data to display                  ASSESSMENT: Current Emotional / Mental Status (status of significant symptoms):   Risk status (Self / Other harm or suicidal ideation)   Patient denies current fears or concerns for personal safety.   Patient denies current or recent suicidal ideation or behaviors.   Patient denies current or recent homicidal ideation or behaviors.   Patient denies current or recent self injurious behavior or ideation.   Patient denies other safety concerns.   Patient reports there has been no change in risk factors since their last session.     Patient reports there has been no change in protective factors since their last session.     Recommended that patient call 911 or go to the local ED should there be a change in any of these risk factors.     Appearance:   Appropriate    Eye Contact:   Good    Psychomotor Behavior: Normal    Attitude:   Cooperative  Pleasant   Orientation:   All   Speech    Rate / Production: Normal     Volume:  Normal    Mood:    Depressed    Affect:    Appropriate    Thought Content:  Clear    Thought Form:  Coherent  Logical    Insight:    Good      Medication Review:     Current Outpatient Medications:     atorvastatin (LIPITOR) 10 MG tablet, Take 1 tablet (10 mg) by mouth daily, Disp: 90 tablet, Rfl: 3    blood glucose (NO BRAND SPECIFIED) test strip, Use to test blood sugar 2 times a week. To accompany: Blood Glucose Monitor  Brands: per insurance., Disp: 100 strip, Rfl: 6    blood glucose monitoring (NO BRAND SPECIFIED) meter device kit, Use to test blood sugar 2 times weekly. Preferred blood glucose meter OR supplies to accompany: Blood Glucose Monitor Brands: per insurance., Disp: 1 kit, Rfl: 0    citalopram (CELEXA) 40 MG tablet, Take 1 tablet (40 mg) by mouth daily, Disp: 90 tablet, Rfl: 3    IBANdronate (BONIVA) 150 MG tablet, Take 1 tablet (150 mg) by mouth every 30 days, Disp: 12 tablet, Rfl: 1    levothyroxine (SYNTHROID/LEVOTHROID) 100 MCG tablet, Take 1 tablet (100 mcg) by mouth daily, Disp: 90 tablet, Rfl: 3    lisinopril-hydrochlorothiazide (ZESTORETIC) 10-12.5 MG tablet, Take 1 tablet by mouth daily, Disp: 90 tablet, Rfl: 3    metFORMIN (GLUCOPHAGE XR) 500 MG 24 hr tablet, Take 1 tablet (500 mg) by mouth daily (with dinner), Disp: 90 tablet, Rfl: 3    thin (NO BRAND SPECIFIED) lancets, Use with lanceting device. To accompany: Blood Glucose Monitor Brands: per insurance., Disp: 100 each, Rfl: 6       Medication Compliance:   Yes     Changes in Health Issues:   None reported     Chemical Use Review:   Substance Use: Chemical use reviewed, no active concerns identified      Tobacco Use: No current tobacco use.      Diagnosis:  1. Adjustment disorder with mixed anxiety and depressed mood      Collateral Reports Completed:   Not Applicable    PLAN: (Patient Tasks / Therapist Tasks / Other)  Consider in building activities-fitness center, walking around building. Engage in entertainment that is soothing over triggering. Notice distorted thoughts. Check out building activities, address mail    Yadira Etienne, [facility] 1/15/2024      _________________________________________________________

## 2024-01-28 ASSESSMENT — PATIENT HEALTH QUESTIONNAIRE - PHQ9
10. IF YOU CHECKED OFF ANY PROBLEMS, HOW DIFFICULT HAVE THESE PROBLEMS MADE IT FOR YOU TO DO YOUR WORK, TAKE CARE OF THINGS AT HOME, OR GET ALONG WITH OTHER PEOPLE: SOMEWHAT DIFFICULT
SUM OF ALL RESPONSES TO PHQ QUESTIONS 1-9: 4
SUM OF ALL RESPONSES TO PHQ QUESTIONS 1-9: 4

## 2024-01-29 ENCOUNTER — VIRTUAL VISIT (OUTPATIENT)
Dept: PSYCHOLOGY | Facility: CLINIC | Age: 64
End: 2024-01-29
Payer: MEDICARE

## 2024-01-29 DIAGNOSIS — F43.23 ADJUSTMENT DISORDER WITH MIXED ANXIETY AND DEPRESSED MOOD: Primary | ICD-10-CM

## 2024-01-29 PROCEDURE — 90837 PSYTX W PT 60 MINUTES: CPT | Mod: 95

## 2024-01-29 NOTE — PROGRESS NOTES
[facility]                                     Progress Note    Patient Name: Eli Wall  Date: 1/29/24         Service Type: Individual      Session Start Time: 2:00 pm  Session End Time: 2:53 pm     Session Length: 53 min    Session #: 6    Attendees: Client attended alone    Service Modality:  Video Visit:      Provider verified identity through the following two step process.  Patient provided:  Patient is known previously to provider    Telemedicine Visit: The patient's condition can be safely assessed and treated via synchronous audio and visual telemedicine encounter.      Reason for Telemedicine Visit: Patient convenience (e.g. access to timely appointments / distance to available provider)    Originating Site (Patient Location): Patient's home    Distant Site (Provider Location): Provider Remote Setting- Home Office    Consent:  The patient/guardian has verbally consented to: the potential risks and benefits of telemedicine (video visit) versus in person care; bill my insurance or make self-payment for services provided; and responsibility for payment of non-covered services.     Patient would like the video invitation sent by:  My Chart    Mode of Communication:  Video Conference via Amwell    Distant Location (Provider):  Off-site    As the provider I attest to compliance with applicable laws and regulations related to telemedicine.    DATA  Interactive Complexity: No  Crisis: No        Progress Since Last Session (Related to Symptoms / Goals / Homework):   Symptoms: No change      Homework: Achieved / completed to satisfaction      Episode of Care Goals: Minimal progress - CONTEMPLATION (Considering change and yet undecided); Intervened by assessing the negative and positive thinking (ambivalence) about behavior change     Current / Ongoing Stressors and Concerns:   had seizure, continuing dementia. Interest and fear of finances/getting some employment. Noticing a  reduction in fear, ability to function. Reconnecting with estranged brother who had a history of anger with her now  mother.  Living in a seniors apartment in a new area of [facility].      Treatment Objective(s) Addressed in This Session:   participate in 1-2 activities to improve mood     Intervention:   Solution Focused: surfaced lack of consistent contact with most family in the area, desire for contact     Motivational Interviewing  Target Behavior: disease management/lifestyle changes walking/fitness center, reading/entertainment options  attend to bills, financials     Stage of Change: ACTION (Actively working towards change)    MI Intervention: Expressed Empathy/Understanding, Permission to raise concern or advise, Open-ended questions, and Reflections: simple and complex     Change Talk Expressed by the Patient: Desire to change Reasons to change Taking steps    Provider Response to Change Talk: E - Evoked more info from patient about behavior change and A - Affirmed patient's thoughts, decisions, or attempts at behavior change      Assessments completed prior to visit:  The following assessments were completed by patient for this visit:  PHQ2:       2023     7:39 PM   PHQ-2 (  [employer])   Q1: Little interest or pleasure in doing things 0   Q2: Feeling down, depressed or hopeless 0   PHQ-2 Score 0   Q1: Little interest or pleasure in doing things Not at all   Q2: Feeling down, depressed or hopeless Not at all   PHQ-2 Score 0     PHQ9:       10/3/2023    10:50 AM 2023    10:55 PM 2024     9:48 AM 2024     5:30 PM   PHQ-9 SCORE   PHQ-9 Total Score MyChart 5 (Mild depression) 2 (Minimal depression) 3 (Minimal depression) 4 (Minimal depression)   PHQ-9 Total Score 5 2 3 4     GAD2:       2023    11:52 AM 10/25/2023    12:48 PM 2023    10:58 AM 2023     9:50 AM 2024     9:29 AM 2024     5:31 PM   NENA-2   Feeling nervous, anxious, or on edge 1 1 0 1 0 0   Not  being able to stop or control worrying 1 0 0 0 0 1   NENA-2 Total Score 2 1 0 1 0 1     GAD7:       5/22/2023     7:45 PM   NENA-7 SCORE   Total Score 4 (minimal anxiety)   Total Score 4     CAGE-AID:       9/26/2023    11:54 AM   CAGE-AID Total Score   Total Score 0   Total Score MyChart 0 (A total score of 2 or greater is considered clinically significant)     PROMIS 10-Global Health (only subscores and total score):       9/26/2023    11:53 AM 1/8/2024     9:31 AM 1/28/2024     5:32 PM   PROMIS-10 Scores Only   Global Mental Health Score 11 11 11   Global Physical Health Score 16 17 16   PROMIS TOTAL - SUBSCORES 27 28 27     [place] Suicide Severity Rating Scale (Lifetime/Recent)       No data to display                  ASSESSMENT: Current Emotional / Mental Status (status of significant symptoms):   Risk status (Self / Other harm or suicidal ideation)   Patient denies current fears or concerns for personal safety.   Patient denies current or recent suicidal ideation or behaviors.   Patient denies current or recent homicidal ideation or behaviors.   Patient denies current or recent self injurious behavior or ideation.   Patient denies other safety concerns.   Patient reports there has been no change in risk factors since their last session.     Patient reports there has been no change in protective factors since their last session.     Recommended that patient call 911 or go to the local ED should there be a change in any of these risk factors.     Appearance:   Appropriate    Eye Contact:   Good    Psychomotor Behavior: Normal    Attitude:   Cooperative  Pleasant   Orientation:   All   Speech    Rate / Production: Normal     Volume:  Normal    Mood:    Depressed    Affect:    Appropriate    Thought Content:  Clear    Thought Form:  Coherent  Logical    Insight:    Good      Medication Review:     Current Outpatient Medications:     atorvastatin (LIPITOR) 10 MG tablet, Take 1 tablet (10 mg) by mouth daily, Disp:  90 tablet, Rfl: 3    blood glucose (NO BRAND SPECIFIED) test strip, Use to test blood sugar 2 times a week. To accompany: Blood Glucose Monitor Brands: per insurance., Disp: 100 strip, Rfl: 6    blood glucose monitoring (NO BRAND SPECIFIED) meter device kit, Use to test blood sugar 2 times weekly. Preferred blood glucose meter OR supplies to accompany: Blood Glucose Monitor Brands: per insurance., Disp: 1 kit, Rfl: 0    citalopram (CELEXA) 40 MG tablet, Take 1 tablet (40 mg) by mouth daily, Disp: 90 tablet, Rfl: 3    IBANdronate (BONIVA) 150 MG tablet, Take 1 tablet (150 mg) by mouth every 30 days, Disp: 12 tablet, Rfl: 1    levothyroxine (SYNTHROID/LEVOTHROID) 100 MCG tablet, Take 1 tablet (100 mcg) by mouth daily, Disp: 90 tablet, Rfl: 3    lisinopril-hydrochlorothiazide (ZESTORETIC) 10-12.5 MG tablet, Take 1 tablet by mouth daily, Disp: 90 tablet, Rfl: 3    metFORMIN (GLUCOPHAGE XR) 500 MG 24 hr tablet, Take 1 tablet (500 mg) by mouth daily (with dinner), Disp: 90 tablet, Rfl: 3    thin (NO BRAND SPECIFIED) lancets, Use with lanceting device. To accompany: Blood Glucose Monitor Brands: per insurance., Disp: 100 each, Rfl: 6       Medication Compliance:   Yes     Changes in Health Issues:   None reported     Chemical Use Review:   Substance Use: Chemical use reviewed, no active concerns identified      Tobacco Use: No current tobacco use.      Diagnosis:  1. Adjustment disorder with mixed anxiety and depressed mood      Collateral Reports Completed:   Not Applicable    PLAN: (Patient Tasks / Therapist Tasks / Other)  Consider in building activities-fitness center, walking around building. Engage in entertainment that is soothing over triggering. Notice distorted thoughts. Continue with building activities, address financmargi May Clafabrice, [facility] 1/[facility]/2024      _________________________________________________________

## 2024-02-13 ASSESSMENT — PATIENT HEALTH QUESTIONNAIRE - PHQ9
SUM OF ALL RESPONSES TO PHQ QUESTIONS 1-9: 4
SUM OF ALL RESPONSES TO PHQ QUESTIONS 1-9: 4
10. IF YOU CHECKED OFF ANY PROBLEMS, HOW DIFFICULT HAVE THESE PROBLEMS MADE IT FOR YOU TO DO YOUR WORK, TAKE CARE OF THINGS AT HOME, OR GET ALONG WITH OTHER PEOPLE: SOMEWHAT DIFFICULT

## 2024-02-14 ENCOUNTER — VIRTUAL VISIT (OUTPATIENT)
Dept: PSYCHOLOGY | Facility: CLINIC | Age: 64
End: 2024-02-14
Payer: MEDICARE

## 2024-02-14 DIAGNOSIS — F43.23 ADJUSTMENT DISORDER WITH MIXED ANXIETY AND DEPRESSED MOOD: Primary | ICD-10-CM

## 2024-02-14 PROCEDURE — 90837 PSYTX W PT 60 MINUTES: CPT | Mod: 95

## 2024-02-14 NOTE — PROGRESS NOTES
[facility]                                     Progress Note    Patient Name: Eli Wall  Date: 2/14/24         Service Type: Individual      Session Start Time: 1:00 pm  Session End Time: 1:53 pm     Session Length: 53 min    Session #: 7    Attendees: Client attended alone    Service Modality:  Video Visit:      Provider verified identity through the following two step process.  Patient provided:  Patient is known previously to provider    Telemedicine Visit: The patient's condition can be safely assessed and treated via synchronous audio and visual telemedicine encounter.      Reason for Telemedicine Visit: Patient convenience (e.g. access to timely appointments / distance to available provider)    Originating Site (Patient Location): Patient's home    Distant Site (Provider Location): Provider Remote Setting- Home Office    Consent:  The patient/guardian has verbally consented to: the potential risks and benefits of telemedicine (video visit) versus in person care; bill my insurance or make self-payment for services provided; and responsibility for payment of non-covered services.     Patient would like the video invitation sent by:  My Chart    Mode of Communication:  Video Conference via Amwell    Distant Location (Provider):  Off-site    As the provider I attest to compliance with applicable laws and regulations related to telemedicine.    DATA  Interactive Complexity: No  Crisis: No        Progress Since Last Session (Related to Symptoms / Goals / Homework):   Symptoms: No change      Homework: Achieved / completed to satisfaction      Episode of Care Goals: Minimal progress - CONTEMPLATION (Considering change and yet undecided); Intervened by assessing the negative and positive thinking (ambivalence) about behavior change     Current / Ongoing Stressors and Concerns:   had seizure, continuing dementia, self-centeredness driving loneliness. Interest and fear of  finances/getting some employment.  Reconnecting with estranged brother who had a history of anger with her now  mother.  Living in a seniors apartment in a new area of [facility].      Treatment Objective(s) Addressed in This Session:   participate in 1-2 activities to improve mood     Intervention:   Solution Focused: surfaced lack of consistent contact with most family in the area, desire for contact     Motivational Interviewing  Target Behavior: disease management/lifestyle changes walking/fitness center, reading/entertainment options  attend to bills, financials,  seek out social options    Stage of Change: ACTION (Actively working towards change)    MI Intervention: Expressed Empathy/Understanding, Permission to raise concern or advise, Open-ended questions, and Reflections: simple and complex     Change Talk Expressed by the Patient: Desire to change Reasons to change Taking steps    Provider Response to Change Talk: E - Evoked more info from patient about behavior change and A - Affirmed patient's thoughts, decisions, or attempts at behavior change      Assessments completed prior to visit:  The following assessments were completed by patient for this visit:  PHQ2:       2023     7:39 PM   PHQ-2 (  [employer])   Q1: Little interest or pleasure in doing things 0   Q2: Feeling down, depressed or hopeless 0   PHQ-2 Score 0   Q1: Little interest or pleasure in doing things Not at all   Q2: Feeling down, depressed or hopeless Not at all   PHQ-2 Score 0     PHQ9:       10/3/2023    10:50 AM 2023    10:55 PM 2024     9:48 AM 2024     5:30 PM 2024     9:33 AM   PHQ-9 SCORE   PHQ-9 Total Score MyChart 5 (Mild depression) 2 (Minimal depression) 3 (Minimal depression) 4 (Minimal depression) 4 (Minimal depression)   PHQ-9 Total Score 5 2 3 4 4     GAD2:       2023    11:52 AM 10/25/2023    12:48 PM 2023    10:58 AM 2023     9:50 AM 2024     9:29 AM 2024     5:31 PM  2/9/2024    12:09 PM   NENA-2   Feeling nervous, anxious, or on edge 1 1 0 1 0 0 1   Not being able to stop or control worrying 1 0 0 0 0 1 1   NENA-2 Total Score 2 1 0 1 0 1 2     GAD7:       5/22/2023     7:45 PM   NENA-7 SCORE   Total Score 4 (minimal anxiety)   Total Score 4     CAGE-AID:       9/26/2023    11:54 AM   CAGE-AID Total Score   Total Score 0   Total Score MyChart 0 (A total score of 2 or greater is considered clinically significant)     PROMIS 10-Global Health (only subscores and total score):       9/26/2023    11:53 AM 1/8/2024     9:31 AM 1/28/2024     5:32 PM   PROMIS-10 Scores Only   Global Mental Health Score 11 11 11   Global Physical Health Score 16 17 16   PROMIS TOTAL - SUBSCORES 27 28 27     [place] Suicide Severity Rating Scale (Lifetime/Recent)       No data to display                  ASSESSMENT: Current Emotional / Mental Status (status of significant symptoms):   Risk status (Self / Other harm or suicidal ideation)   Patient denies current fears or concerns for personal safety.   Patient denies current or recent suicidal ideation or behaviors.   Patient denies current or recent homicidal ideation or behaviors.   Patient denies current or recent self injurious behavior or ideation.   Patient denies other safety concerns.   Patient reports there has been no change in risk factors since their last session.     Patient reports there has been no change in protective factors since their last session.     Recommended that patient call 911 or go to the local ED should there be a change in any of these risk factors.     Appearance:   Appropriate    Eye Contact:   Good    Psychomotor Behavior: Normal    Attitude:   Cooperative  Pleasant   Orientation:   All   Speech    Rate / Production: Normal     Volume:  Normal    Mood:    Depressed    Affect:    Appropriate    Thought Content:  Clear    Thought Form:  Coherent  Logical    Insight:    Good      Medication Review:     Current Outpatient  Medications:     atorvastatin (LIPITOR) 10 MG tablet, Take 1 tablet (10 mg) by mouth daily, Disp: 90 tablet, Rfl: 3    blood glucose (NO BRAND SPECIFIED) test strip, Use to test blood sugar 2 times a week. To accompany: Blood Glucose Monitor [employer]ands: per insurance., Disp: 100 strip, Rfl: 6    blood glucose monitoring (NO BRAND SPECIFIED) meter device kit, Use to test blood sugar 2 times weekly. Preferred blood glucose meter OR supplies to accompany: Blood Glucose Monitor Brands: per insurance., Disp: 1 kit, Rfl: 0    citalopram (CELEXA) 40 MG tablet, Take 1 tablet (40 mg) by mouth daily, Disp: 90 tablet, Rfl: 3    IBANdronate (BONIVA) 150 MG tablet, Take 1 tablet (150 mg) by mouth every 30 days, Disp: 12 tablet, Rfl: 1    levothyroxine (SYNTHROID/LEVOTHROID) 100 MCG tablet, Take 1 tablet (100 mcg) by mouth daily, Disp: 90 tablet, Rfl: 3    lisinopril-hydrochlorothiazide (ZESTORETIC) 10-12.5 MG tablet, Take 1 tablet by mouth daily, Disp: 90 tablet, Rfl: 3    metFORMIN (GLUCOPHAGE XR) 500 MG 24 hr tablet, Take 1 tablet (500 mg) by mouth daily (with dinner), Disp: 90 tablet, Rfl: 3    thin (NO BRAND SPECIFIED) lancets, Use with lanceting device. To accompany: Blood Glucose Monitor Brands: per insurance., Disp: 100 each, Rfl: 6       Medication Compliance:   Yes     Changes in Health Issues:   None reported     Chemical Use Review:   Substance Use: Chemical use reviewed, no active concerns identified      Tobacco Use: No current tobacco use.      Diagnosis:  1. Adjustment disorder with mixed anxiety and depressed mood        Collateral Reports Completed:   Not Applicable    PLAN: (Patient Tasks / Therapist Tasks / Other)  Consider in building activities-fitness center, walking around building. Engage in entertainment that is soothing over triggering. Notice distorted thoughts. Continue with building activities, address finances, seek out social options    Yadira Etienne  [facility] 2/14/2024      _________________________________________________________

## 2024-02-27 ENCOUNTER — VIRTUAL VISIT (OUTPATIENT)
Dept: PSYCHOLOGY | Facility: CLINIC | Age: 64
End: 2024-02-27
Payer: MEDICARE

## 2024-02-27 DIAGNOSIS — F41.1 GAD (GENERALIZED ANXIETY DISORDER): Primary | ICD-10-CM

## 2024-02-27 DIAGNOSIS — F43.23 ADJUSTMENT DISORDER WITH MIXED ANXIETY AND DEPRESSED MOOD: ICD-10-CM

## 2024-02-27 PROCEDURE — 90837 PSYTX W PT 60 MINUTES: CPT | Mod: 95

## 2024-02-27 NOTE — PROGRESS NOTES
[facility]                                     Progress Note    Patient Name: Eli Wall  Date: 2/27/24      Service Type: Individual      Session Start Time: 9:00 am  Session End Time: 9:53 am     Session Length: 53 min    Session #: 8    Attendees: Client attended alone    Service Modality:  Video Visit:      Provider verified identity through the following two step process.  Patient provided:  Patient is known previously to provider    Telemedicine Visit: The patient's condition can be safely assessed and treated via synchronous audio and visual telemedicine encounter.      Reason for Telemedicine Visit: Patient convenience (e.g. access to timely appointments / distance to available provider)    Originating Site (Patient Location): Patient's home    Distant Site (Provider Location): Provider Remote Setting- Home Office    Consent:  The patient/guardian has verbally consented to: the potential risks and benefits of telemedicine (video visit) versus in person care; bill my insurance or make self-payment for services provided; and responsibility for payment of non-covered services.     Patient would like the video invitation sent by:  My Chart    Mode of Communication:  Video Conference via Amwell    Distant Location (Provider):  Off-site    As the provider I attest to compliance with applicable laws and regulations related to telemedicine.    DATA  Interactive Complexity: No  Crisis: No        Progress Since Last Session (Related to Symptoms / Goals / Homework):   Symptoms: No change      Homework: Achieved / completed to satisfaction      Episode of Care Goals: Minimal progress - CONTEMPLATION (Considering change and yet undecided); Intervened by assessing the negative and positive thinking (ambivalence) about behavior change     Current / Ongoing Stressors and Concerns:  Inertia regarding life priorities/tasks.  had seizure, continuing dementia, self-centeredness driving  loneliness. Interest and fear of finances/getting some employment.  Reconnecting with estranged brother who had a history of anger with her now  mother.  Living in a seniors apartment in a new area of [facility].      Treatment Objective(s) Addressed in This Session:   participate in 1-2 activities to improve mood     Intervention:   Solution Focused: surfaced lack of consistent contact with most family in the area, desire for contact     Motivational Interviewing  Target Behavior: disease management/lifestyle changes walking/fitness center, reading/entertainment options  attend to bills, financials,  seek out social options    Stage of Change: ACTION (Actively working towards change)    MI Intervention: Expressed Empathy/Understanding, Permission to raise concern or advise, Open-ended questions, and Reflections: simple and complex     Change Talk Expressed by the Patient: Desire to change Reasons to change Taking steps    Provider Response to Change Talk: E - Evoked more info from patient about behavior change and A - Affirmed patient's thoughts, decisions, or attempts at behavior change      Assessments completed prior to visit:  The following assessments were completed by patient for this visit:  PHQ2:       2023     7:39 PM   PHQ-2 ( 1999 [employer])   Q1: Little interest or pleasure in doing things 0   Q2: Feeling down, depressed or hopeless 0   PHQ-2 Score 0   Q1: Little interest or pleasure in doing things Not at all   Q2: Feeling down, depressed or hopeless Not at all   PHQ-2 Score 0     PHQ9:       10/3/2023    10:50 AM 2023    10:55 PM 2024     9:48 AM 2024     5:30 PM 2024     9:33 AM   PHQ-9 SCORE   PHQ-9 Total Score MyChart 5 (Mild depression) 2 (Minimal depression) 3 (Minimal depression) 4 (Minimal depression) 4 (Minimal depression)   PHQ-9 Total Score 5 2 3 4 4     GAD2:       2023    11:52 AM 10/25/2023    12:48 PM 2023    10:58 AM 2023     9:50 AM 2024      9:29 AM 1/28/2024     5:31 PM 2/9/2024    12:09 PM   NENA-2   Feeling nervous, anxious, or on edge 1 1 0 1 0 0 1   Not being able to stop or control worrying 1 0 0 0 0 1 1   NENA-2 Total Score 2 1 0 1 0 1 2     GAD7:       5/22/2023     7:45 PM   NENA-7 SCORE   Total Score 4 (minimal anxiety)   Total Score 4     CAGE-AID:       9/26/2023    11:54 AM   CAGE-AID Total Score   Total Score 0   Total Score MyChart 0 (A total score of 2 or greater is considered clinically significant)     PROMIS 10-Global Health (only subscores and total score):       9/26/2023    11:53 AM 1/8/2024     9:31 AM 1/28/2024     5:32 PM   PROMIS-10 Scores Only   Global Mental Health Score 11 11 11   Global Physical Health Score 16 17 16   PROMIS TOTAL - SUBSCORES 27 28 27     [place] Suicide Severity Rating Scale (Lifetime/Recent)       No data to display                  ASSESSMENT: Current Emotional / Mental Status (status of significant symptoms):   Risk status (Self / Other harm or suicidal ideation)   Patient denies current fears or concerns for personal safety.   Patient denies current or recent suicidal ideation or behaviors.   Patient denies current or recent homicidal ideation or behaviors.   Patient denies current or recent self injurious behavior or ideation.   Patient denies other safety concerns.   Patient reports there has been no change in risk factors since their last session.     Patient reports there has been no change in protective factors since their last session.     Recommended that patient call 911 or go to the local ED should there be a change in any of these risk factors.     Appearance:   Appropriate    Eye Contact:   Good    Psychomotor Behavior: Normal    Attitude:   Cooperative  Pleasant   Orientation:   All   Speech    Rate / Production: Normal     Volume:  Normal    Mood:    Depressed    Affect:    Appropriate    Thought Content:  Clear    Thought Form:  Coherent  Logical    Insight:    Good      Medication  Review:     Current Outpatient Medications:     atorvastatin (LIPITOR) 10 MG tablet, Take 1 tablet (10 mg) by mouth daily, Disp: 90 tablet, Rfl: 3    blood glucose (NO BRAND SPECIFIED) test strip, Use to test blood sugar 2 times a week. To accompany: Blood Glucose Monitor Brands: per insurance., Disp: 100 strip, Rfl: 6    blood glucose monitoring (NO BRAND SPECIFIED) meter device kit, Use to test blood sugar 2 times weekly. Preferred blood glucose meter OR supplies to accompany: Blood Glucose Monitor Brands: per insurance., Disp: 1 kit, Rfl: 0    citalopram (CELEXA) 40 MG tablet, Take 1 tablet (40 mg) by mouth daily, Disp: 90 tablet, Rfl: 3    IBANdronate (BONIVA) 150 MG tablet, Take 1 tablet (150 mg) by mouth every 30 days, Disp: 12 tablet, Rfl: 1    levothyroxine (SYNTHROID/LEVOTHROID) 100 MCG tablet, Take 1 tablet (100 mcg) by mouth daily, Disp: 90 tablet, Rfl: 3    lisinopril-hydrochlorothiazide (ZESTORETIC) 10-12.5 MG tablet, Take 1 tablet by mouth daily, Disp: 90 tablet, Rfl: 3    metFORMIN (GLUCOPHAGE XR) 500 MG 24 hr tablet, Take 1 tablet (500 mg) by mouth daily (with dinner), Disp: 90 tablet, Rfl: 3    thin (NO BRAND SPECIFIED) lancets, Use with lanceting device. To accompany: Blood Glucose Monitor Brands: per insurance., Disp: 100 each, Rfl: 6       Medication Compliance:   Yes     Changes in Health Issues:   None reported     Chemical Use Review:   Substance Use: Chemical use reviewed, no active concerns identified      Tobacco Use: No current tobacco use.      Diagnosis:  1. NENA (generalized anxiety disorder)    2. Adjustment disorder with mixed anxiety and depressed mood        Collateral Reports Completed:   Not Applicable    PLAN: (Patient Tasks / Therapist Tasks / Other)  Consider in building activities-fitness center, walking around building. Engage in entertainment that is soothing over triggering. Notice distorted thoughts. Continue with building activities, address finances, seek out social  options    Yadira Etienne, [facility] 2/27/2024      _________________________________________________                                              Individual Treatment Plan    Patient's Name: Eli Wall  YOB: 1960    Date of Creation: 2/27/24  Date Treatment Plan Last Reviewed/Revised:     DSM5 Diagnoses: 300.02 (F41.1) Generalized Anxiety Disorder or Adjustment Disorders  309.28 (F43.23) With mixed anxiety and depressed mood  Psychosocial / Contextual Factors: caregiver, isolation  PROMIS (reviewed every 90 days): 28 1/8/24    Referral / Collaboration:  Referral to another professional/service is not indicated at this time..    Anticipated number of session for this episode of care:  12+ sessions  Anticipation frequency of session: Every other week  Anticipated Duration of each session: 53 or more minutes  Treatment plan will be reviewed in 90 days or when goals have been changed.       MeasurableTreatment Goal(s) related to diagnosis / functional impairment(s)  Goal 1: Patient will engage in activities to support wellbeing and improved mood    I will know I've met my goal when .        Objective #B  Patient will Increase interest, engagement, and pleasure in doing things  Decrease frequency and intensity of feeling down, depressed, hopeless  Improve quantity and quality of night time sleep / decrease daytime naps  Feel less tired and more energy during the day   Improve diet, appetite, mindful eating, and / or meal planning  Identify negative self-talk and behaviors: challenge core beliefs, myths, and actions  Improve concentration, focus, and mindfulness in daily activities   Feel less fidgety, restless or slow in daily activities / interpersonal interactions.  Status: New - Date: 2/27/24      Intervention(s)  Therapist will  teach skills, use MI and assign homework .      Patient has reviewed and agreed to the above plan.      Yadira Etienne, [facility]  February 27, 2024

## 2024-03-13 ENCOUNTER — VIRTUAL VISIT (OUTPATIENT)
Dept: PSYCHOLOGY | Facility: CLINIC | Age: 64
End: 2024-03-13
Payer: MEDICARE

## 2024-03-13 DIAGNOSIS — F43.23 ADJUSTMENT DISORDER WITH MIXED ANXIETY AND DEPRESSED MOOD: Primary | ICD-10-CM

## 2024-03-13 PROCEDURE — 90837 PSYTX W PT 60 MINUTES: CPT | Mod: 95

## 2024-03-13 NOTE — PROGRESS NOTES
[facility]                                     Progress Note    Patient Name: Eli Wall  Date: 3/13/24      Service Type: Individual      Session Start Time: 2:00 pm  Session End Time: 2:53 pm     Session Length: 53 min    Session #: 9    Attendees: Client attended alone    Service Modality:  Video Visit:      Provider verified identity through the following two step process.  Patient provided:  Patient is known previously to provider    Telemedicine Visit: The patient's condition can be safely assessed and treated via synchronous audio and visual telemedicine encounter.      Reason for Telemedicine Visit: Patient convenience (e.g. access to timely appointments / distance to available provider)    Originating Site (Patient Location): Patient's home    Distant Site (Provider Location): Provider Remote Setting- Home Office    Consent:  The patient/guardian has verbally consented to: the potential risks and benefits of telemedicine (video visit) versus in person care; bill my insurance or make self-payment for services provided; and responsibility for payment of non-covered services.     Patient would like the video invitation sent by:  My Chart    Mode of Communication:  Video Conference via Amwell    Distant Location (Provider):  Off-site    As the provider I attest to compliance with applicable laws and regulations related to telemedicine.    DATA  Interactive Complexity: No  Crisis: No        Progress Since Last Session (Related to Symptoms / Goals / Homework):   Symptoms: No change      Homework: Achieved / completed to satisfaction      Episode of Care Goals: Satisfactory progress - ACTION (Actively working towards change); Intervened by reinforcing change plan / affirming steps taken     Current / Ongoing Stressors and Concerns:  Inertia regarding life priorities/tasks.  had seizure, continuing dementia, self-centeredness driving loneliness. Interest and fear of finances/getting  some employment.  Reconnecting with estranged brother who had a history of anger with her now  mother.  Living in a seniors apartment in a new area of [facility].      Treatment Objective(s) Addressed in This Session:   participate in 1-2 activities to improve mood     Intervention:   Solution Focused: surfaced lack of consistent contact with most family in the area, desire for contact     Motivational Interviewing  Target Behavior: disease management/lifestyle changes walking/fitness center, reading/entertainment options  attend to bills, financials,  seek out social options, establish accountability systems for , plan fun days    Stage of Change: ACTION (Actively working towards change)    MI Intervention: Expressed Empathy/Understanding, Permission to raise concern or advise, Open-ended questions, and Reflections: simple and complex     Change Talk Expressed by the Patient: Desire to change Reasons to change Taking steps    Provider Response to Change Talk: E - Evoked more info from patient about behavior change and A - Affirmed patient's thoughts, decisions, or attempts at behavior change      Assessments completed prior to visit:  The following assessments were completed by patient for this visit:  PHQ2:       2023     7:39 PM   PHQ-2 ( 1999 [employer])   Q1: Little interest or pleasure in doing things 0   Q2: Feeling down, depressed or hopeless 0   PHQ-2 Score 0   Q1: Little interest or pleasure in doing things Not at all   Q2: Feeling down, depressed or hopeless Not at all   PHQ-2 Score 0     PHQ9:       10/3/2023    10:50 AM 2023    10:55 PM 2024     9:48 AM 2024     5:30 PM 2024     9:33 AM   PHQ-9 SCORE   PHQ-9 Total Score MyChart 5 (Mild depression) 2 (Minimal depression) 3 (Minimal depression) 4 (Minimal depression) 4 (Minimal depression)   PHQ-9 Total Score 5 2 3 4 4     GAD2:       10/25/2023    12:48 PM 2023    10:58 AM 2023     9:50 AM 2024     9:29 AM  1/28/2024     5:31 PM 2/9/2024    12:09 PM 3/11/2024     2:13 PM   NENA-2   Feeling nervous, anxious, or on edge 1 0 1 0 0 1 1   Not being able to stop or control worrying 0 0 0 0 1 1 1   NENA-2 Total Score 1 0 1 0 1 2 2     GAD7:       5/22/2023     7:45 PM   NENA-7 SCORE   Total Score 4 (minimal anxiety)   Total Score 4     CAGE-AID:       9/26/2023    11:54 AM   CAGE-AID Total Score   Total Score 0   Total Score MyChart 0 (A total score of 2 or greater is considered clinically significant)     PROMIS 10-Global Health (only subscores and total score):       9/26/2023    11:53 AM 1/8/2024     9:31 AM 1/28/2024     5:32 PM   PROMIS-10 Scores Only   Global Mental Health Score 11 11 11   Global Physical Health Score 16 17 16   PROMIS TOTAL - SUBSCORES 27 28 27     [place] Suicide Severity Rating Scale (Lifetime/Recent)       No data to display                  ASSESSMENT: Current Emotional / Mental Status (status of significant symptoms):   Risk status (Self / Other harm or suicidal ideation)   Patient denies current fears or concerns for personal safety.   Patient denies current or recent suicidal ideation or behaviors.   Patient denies current or recent homicidal ideation or behaviors.   Patient denies current or recent self injurious behavior or ideation.   Patient denies other safety concerns.   Patient reports there has been no change in risk factors since their last session.     Patient reports there has been no change in protective factors since their last session.     Recommended that patient call 911 or go to the local ED should there be a change in any of these risk factors.     Appearance:   Appropriate    Eye Contact:   Good    Psychomotor Behavior: Normal    Attitude:   Cooperative  Pleasant   Orientation:   All   Speech    Rate / Production: Normal     Volume:  Normal    Mood:    Normal empowered   Affect:    Appropriate    Thought Content:  Clear    Thought Form:  Coherent  Logical    Insight:    Good       Medication Review:     Current Outpatient Medications:     atorvastatin (LIPITOR) 10 MG tablet, Take 1 tablet (10 mg) by mouth daily, Disp: 90 tablet, Rfl: 3    blood glucose (NO BRAND SPECIFIED) test strip, Use to test blood sugar 2 times a week. To accompany: Blood Glucose Monitor Brands: per insurance., Disp: 100 strip, Rfl: 6    blood glucose monitoring (NO BRAND SPECIFIED) meter device kit, Use to test blood sugar 2 times weekly. Preferred blood glucose meter OR supplies to accompany: Blood Glucose Monitor Brands: per insurance., Disp: 1 kit, Rfl: 0    citalopram (CELEXA) 40 MG tablet, Take 1 tablet (40 mg) by mouth daily, Disp: 90 tablet, Rfl: 3    IBANdronate (BONIVA) 150 MG tablet, Take 1 tablet (150 mg) by mouth every 30 days, Disp: 12 tablet, Rfl: 1    levothyroxine (SYNTHROID/LEVOTHROID) 100 MCG tablet, Take 1 tablet (100 mcg) by mouth daily, Disp: 90 tablet, Rfl: 3    lisinopril-hydrochlorothiazide (ZESTORETIC) 10-12.5 MG tablet, Take 1 tablet by mouth daily, Disp: 90 tablet, Rfl: 3    metFORMIN (GLUCOPHAGE XR) 500 MG 24 hr tablet, Take 1 tablet (500 mg) by mouth daily (with dinner), Disp: 90 tablet, Rfl: 3    thin (NO BRAND SPECIFIED) lancets, Use with lanceting device. To accompany: Blood Glucose Monitor Brands: per insurance., Disp: 100 each, Rfl: 6       Medication Compliance:   Yes     Changes in Health Issues:   None reported     Chemical Use Review:   Substance Use: Chemical use reviewed, no active concerns identified      Tobacco Use: No current tobacco use.      Diagnosis:  1. Adjustment disorder with mixed anxiety and depressed mood          Collateral Reports Completed:   Not Applicable    PLAN: (Patient Tasks / Therapist Tasks / Other)   Engage in entertainment that is soothing over triggering. Notice distorted thoughts. Continue with building activities, address finances, seek out social options, fun outings    Yadira Etienne  [facility] 3/13/2024        _________________________________________________                                              Individual Treatment Plan    Patient's Name: Eli Wall  YOB: 1960    Date of Creation: 2/27/24  Date Treatment Plan Last Reviewed/Revised:     DSM5 Diagnoses: 300.02 (F41.1) Generalized Anxiety Disorder or Adjustment Disorders  309.28 (F43.23) With mixed anxiety and depressed mood  Psychosocial / Contextual Factors: caregiver, isolation  PROMIS (reviewed every 90 days): 28 1/8/24    Referral / Collaboration:  Referral to another professional/service is not indicated at this time..    Anticipated number of session for this episode of care:  12+ sessions  Anticipation frequency of session: Every other week  Anticipated Duration of each session: 53 or more minutes  Treatment plan will be reviewed in 90 days or when goals have been changed.       MeasurableTreatment Goal(s) related to diagnosis / functional impairment(s)  Goal 1: Patient will engage in activities to support wellbeing and improved mood    I will know I've met my goal when .        Objective #B  Patient will Increase interest, engagement, and pleasure in doing things  Decrease frequency and intensity of feeling down, depressed, hopeless  Improve quantity and quality of night time sleep / decrease daytime naps  Feel less tired and more energy during the day   Improve diet, appetite, mindful eating, and / or meal planning  Identify negative self-talk and behaviors: challenge core beliefs, myths, and actions  Improve concentration, focus, and mindfulness in daily activities   Feel less fidgety, restless or slow in daily activities / interpersonal interactions.  Status: New - Date: 2/27/24      Intervention(s)  Therapist will  teach skills, use MI and assign homework .      Patient has reviewed and agreed to the above plan.      Yadira Etienne, [facility]  February 27, 2024

## 2024-03-17 ENCOUNTER — HEALTH MAINTENANCE LETTER (OUTPATIENT)
Age: 64
End: 2024-03-17

## 2024-03-27 ASSESSMENT — PATIENT HEALTH QUESTIONNAIRE - PHQ9
10. IF YOU CHECKED OFF ANY PROBLEMS, HOW DIFFICULT HAVE THESE PROBLEMS MADE IT FOR YOU TO DO YOUR WORK, TAKE CARE OF THINGS AT HOME, OR GET ALONG WITH OTHER PEOPLE: NOT DIFFICULT AT ALL
SUM OF ALL RESPONSES TO PHQ QUESTIONS 1-9: 3
SUM OF ALL RESPONSES TO PHQ QUESTIONS 1-9: 3

## 2024-03-28 ENCOUNTER — VIRTUAL VISIT (OUTPATIENT)
Dept: PSYCHOLOGY | Facility: CLINIC | Age: 64
End: 2024-03-28
Payer: MEDICARE

## 2024-03-28 DIAGNOSIS — F43.23 ADJUSTMENT DISORDER WITH MIXED ANXIETY AND DEPRESSED MOOD: Primary | ICD-10-CM

## 2024-03-28 PROCEDURE — 90837 PSYTX W PT 60 MINUTES: CPT | Mod: 95

## 2024-03-28 NOTE — PROGRESS NOTES
[facility]                                     Progress Note    Patient Name: Eli Wall  Date: 3/28/24      Service Type: Individual      Session Start Time: 9:00 am  Session End Time: 9:53 am     Session Length: 53 min    Session #: 10    Attendees: Client attended alone    Service Modality:  Video Visit:      Provider verified identity through the following two step process.  Patient provided:  Patient is known previously to provider    Telemedicine Visit: The patient's condition can be safely assessed and treated via synchronous audio and visual telemedicine encounter.      Reason for Telemedicine Visit: Patient convenience (e.g. access to timely appointments / distance to available provider)    Originating Site (Patient Location): Patient's home    Distant Site (Provider Location): Provider Remote Setting- Home Office    Consent:  The patient/guardian has verbally consented to: the potential risks and benefits of telemedicine (video visit) versus in person care; bill my insurance or make self-payment for services provided; and responsibility for payment of non-covered services.     Patient would like the video invitation sent by:  My Chart    Mode of Communication:  Video Conference via Amwell    Distant Location (Provider):  Off-site    As the provider I attest to compliance with applicable laws and regulations related to telemedicine.    DATA  Interactive Complexity: No  Crisis: No        Progress Since Last Session (Related to Symptoms / Goals / Homework):   Symptoms: Improving mood functioning    Homework: Achieved / completed to satisfaction      Episode of Care Goals: Satisfactory progress - ACTION (Actively working towards change); Intervened by reinforcing change plan / affirming steps taken     Current / Ongoing Stressors and Concerns:  Inertia regarding life priorities/tasks.  had seizure, continuing dementia, self-centeredness driving loneliness. Interest and fear of  finances/getting some employment.  Living in a seniors apartment in a new area of [facility].      Treatment Objective(s) Addressed in This Session:   participate in 1-2 activities to improve mood     Intervention:   Solution Focused: surfaced opportunity/need to identify and prioritize self care-exercise more consistently     Motivational Interviewing  Target Behavior: disease management/lifestyle changes walking/fitness center, reading/entertainment options  attend to bills, financials,  seek out social options, establish accountability systems for , plan fun days    Stage of Change: ACTION (Actively working towards change)    MI Intervention: Expressed Empathy/Understanding, Permission to raise concern or advise, Open-ended questions, and Reflections: simple and complex     Change Talk Expressed by the Patient: Desire to change Reasons to change Taking steps    Provider Response to Change Talk: E - Evoked more info from patient about behavior change and A - Affirmed patient's thoughts, decisions, or attempts at behavior change      Assessments completed prior to visit:  The following assessments were completed by patient for this visit:  PHQ2:       5/22/2023     7:39 PM   PHQ-2 ( 1999 [employer])   Q1: Little interest or pleasure in doing things 0   Q2: Feeling down, depressed or hopeless 0   PHQ-2 Score 0   Q1: Little interest or pleasure in doing things Not at all   Q2: Feeling down, depressed or hopeless Not at all   PHQ-2 Score 0     PHQ9:       10/3/2023    10:50 AM 11/14/2023    10:55 PM 1/14/2024     9:48 AM 1/28/2024     5:30 PM 2/13/2024     9:33 AM 3/27/2024    10:02 AM   PHQ-9 SCORE   PHQ-9 Total Score MyChart 5 (Mild depression) 2 (Minimal depression) 3 (Minimal depression) 4 (Minimal depression) 4 (Minimal depression) 3 (Minimal depression)   PHQ-9 Total Score 5 2 3 4 4 3     GAD2:       11/8/2023    10:58 AM 12/13/2023     9:50 AM 1/8/2024     9:29 AM 1/28/2024     5:31 PM 2/9/2024    12:09 PM  3/11/2024     2:13 PM 3/25/2024    11:57 AM   NENA-2   Feeling nervous, anxious, or on edge 0 1 0 0 1 1 0   Not being able to stop or control worrying 0 0 0 1 1 1 0   NENA-2 Total Score 0 1 0 1 2 2 0     GAD7:       5/22/2023     7:45 PM   NENA-7 SCORE   Total Score 4 (minimal anxiety)   Total Score 4     CAGE-AID:       9/26/2023    11:54 AM   CAGE-AID Total Score   Total Score 0   Total Score MyChart 0 (A total score of 2 or greater is considered clinically significant)     PROMIS 10-Global Health (only subscores and total score):       9/26/2023    11:53 AM 1/8/2024     9:31 AM 1/28/2024     5:32 PM   PROMIS-10 Scores Only   Global Mental Health Score 11 11 11   Global Physical Health Score 16 17 16   PROMIS TOTAL - SUBSCORES 27 28 27     [place] Suicide Severity Rating Scale (Lifetime/Recent)       No data to display                  ASSESSMENT: Current Emotional / Mental Status (status of significant symptoms):   Risk status (Self / Other harm or suicidal ideation)   Patient denies current fears or concerns for personal safety.   Patient denies current or recent suicidal ideation or behaviors.   Patient denies current or recent homicidal ideation or behaviors.   Patient denies current or recent self injurious behavior or ideation.   Patient denies other safety concerns.   Patient reports there has been no change in risk factors since their last session.     Patient reports there has been no change in protective factors since their last session.     Recommended that patient call 911 or go to the local ED should there be a change in any of these risk factors.     Appearance:   Appropriate    Eye Contact:   Good    Psychomotor Behavior: Normal    Attitude:   Cooperative  Pleasant   Orientation:   All   Speech    Rate / Production: Normal     Volume:  Normal    Mood:    Normal empowered   Affect:    Appropriate    Thought Content:  Clear    Thought Form:  Coherent  Logical    Insight:    Good      Medication  Review:     Current Outpatient Medications:     atorvastatin (LIPITOR) 10 MG tablet, Take 1 tablet (10 mg) by mouth daily, Disp: 90 tablet, Rfl: 3    blood glucose (NO BRAND SPECIFIED) test strip, Use to test blood sugar 2 times a week. To accompany: Blood Glucose Monitor Brands: per insurance., Disp: 100 strip, Rfl: 6    blood glucose monitoring (NO BRAND SPECIFIED) meter device kit, Use to test blood sugar 2 times weekly. Preferred blood glucose meter OR supplies to accompany: Blood Glucose Monitor Brands: per insurance., Disp: 1 kit, Rfl: 0    citalopram (CELEXA) 40 MG tablet, Take 1 tablet (40 mg) by mouth daily, Disp: 90 tablet, Rfl: 3    IBANdronate (BONIVA) 150 MG tablet, Take 1 tablet (150 mg) by mouth every 30 days, Disp: 12 tablet, Rfl: 1    levothyroxine (SYNTHROID/LEVOTHROID) 100 MCG tablet, Take 1 tablet (100 mcg) by mouth daily, Disp: 90 tablet, Rfl: 3    lisinopril-hydrochlorothiazide (ZESTORETIC) 10-12.5 MG tablet, Take 1 tablet by mouth daily, Disp: 90 tablet, Rfl: 3    metFORMIN (GLUCOPHAGE XR) 500 MG 24 hr tablet, Take 1 tablet (500 mg) by mouth daily (with dinner), Disp: 90 tablet, Rfl: 3    thin (NO BRAND SPECIFIED) lancets, Use with lanceting device. To accompany: Blood Glucose Monitor Brands: per insurance., Disp: 100 each, Rfl: 6       Medication Compliance:   Yes     Changes in Health Issues:   None reported     Chemical Use Review:   Substance Use: Chemical use reviewed, no active concerns identified      Tobacco Use: No current tobacco use.      Diagnosis:  1. Adjustment disorder with mixed anxiety and depressed mood          Collateral Reports Completed:   Not Applicable    PLAN: (Patient Tasks / Therapist Tasks / Other)   Engage in entertainment that is soothing over triggering. Notice distorted thoughts. Continue with building activities, address finances, seek out social options, fun outings    Yadira Etienne, [facility] 3/28/2024        _________________________________________________                                               Individual Treatment Plan    Patient's Name: Eli Wall  YOB: 1960    Date of Creation: 2/27/24  Date Treatment Plan Last Reviewed/Revised:     DSM5 Diagnoses: 300.02 (F41.1) Generalized Anxiety Disorder or Adjustment Disorders  309.28 (F43.23) With mixed anxiety and depressed mood  Psychosocial / Contextual Factors: caregiver, isolation  PROMIS (reviewed every 90 days): 28 1/8/24    Referral / Collaboration:  Referral to another professional/service is not indicated at this time..    Anticipated number of session for this episode of care:  12+ sessions  Anticipation frequency of session: Every other week  Anticipated Duration of each session: 53 or more minutes  Treatment plan will be reviewed in 90 days or when goals have been changed.       MeasurableTreatment Goal(s) related to diagnosis / functional impairment(s)  Goal 1: Patient will engage in activities to support wellbeing and improved mood    I will know I've met my goal when .        Objective #B  Patient will Increase interest, engagement, and pleasure in doing things  Decrease frequency and intensity of feeling down, depressed, hopeless  Improve quantity and quality of night time sleep / decrease daytime naps  Feel less tired and more energy during the day   Improve diet, appetite, mindful eating, and / or meal planning  Identify negative self-talk and behaviors: challenge core beliefs, myths, and actions  Improve concentration, focus, and mindfulness in daily activities   Feel less fidgety, restless or slow in daily activities / interpersonal interactions.  Status: New - Date: 2/27/24      Intervention(s)  Therapist will  teach skills, use MI and assign homework .      Patient has reviewed and agreed to the above plan.      Yadira Etienne, DORIS  February 27, 2024

## 2024-04-11 ENCOUNTER — VIRTUAL VISIT (OUTPATIENT)
Dept: PSYCHOLOGY | Facility: CLINIC | Age: 64
End: 2024-04-11
Payer: MEDICARE

## 2024-04-11 DIAGNOSIS — F43.23 ADJUSTMENT DISORDER WITH MIXED ANXIETY AND DEPRESSED MOOD: Primary | ICD-10-CM

## 2024-04-11 PROCEDURE — 90837 PSYTX W PT 60 MINUTES: CPT | Mod: 95

## 2024-04-11 NOTE — PROGRESS NOTES
[facility]                                     Progress Note    Patient Name: Eli Wall  Date: 4/11/24      Service Type: Individual      Session Start Time: 1:00 pm  Session End Time: 1:53 pm     Session Length: 53 min    Session #: 11    Attendees: Client attended alone    Service Modality:  Video Visit:      Provider verified identity through the following two step process.  Patient provided:  Patient is known previously to provider    Telemedicine Visit: The patient's condition can be safely assessed and treated via synchronous audio and visual telemedicine encounter.      Reason for Telemedicine Visit: Patient convenience (e.g. access to timely appointments / distance to available provider)    Originating Site (Patient Location): Patient's home    Distant Site (Provider Location): Provider Remote Setting- Home Office    Consent:  The patient/guardian has verbally consented to: the potential risks and benefits of telemedicine (video visit) versus in person care; bill my insurance or make self-payment for services provided; and responsibility for payment of non-covered services.     Patient would like the video invitation sent by:  My Chart    Mode of Communication:  Video Conference via Amwell    Distant Location (Provider):  Off-site    As the provider I attest to compliance with applicable laws and regulations related to telemedicine.    DATA  Interactive Complexity: No  Crisis: No        Progress Since Last Session (Related to Symptoms / Goals / Homework):   Symptoms: Improving mood functioning    Homework: Achieved / completed to satisfaction      Episode of Care Goals: Satisfactory progress - ACTION (Actively working towards change); Intervened by reinforcing change plan / affirming steps taken     Current / Ongoing Stressors and Concerns:   had seizure, continuing dementia, self-centeredness driving loneliness. Interest and fear of finances/considering getting some  employment.  Living in a seniors apartment in a [facility].      Treatment Objective(s) Addressed in This Session:   participate in 1-2 activities to improve mood     Intervention:   Solution Focused: surfaced opportunity/need to identify and prioritize self care-exercise more consistently     Motivational Interviewing  Target Behavior: disease management/lifestyle changes walking/fitness center, reading/entertainment options , focus energy on tasks including mail/bills/phone calls, financials,  seek out social options, establish accountability systems for , plan fun days    Stage of Change: ACTION (Actively working towards change), preparation    MI Intervention: Expressed Empathy/Understanding, Permission to raise concern or advise, Open-ended questions, and Reflections: simple and complex     Change Talk Expressed by the Patient: Desire to change Reasons to change Taking steps    Provider Response to Change Talk: E - Evoked more info from patient about behavior change and A - Affirmed patient's thoughts, decisions, or attempts at behavior change      Assessments completed prior to visit:  The following assessments were completed by patient for this visit:  PHQ2:       5/22/2023     7:39 PM   PHQ-2 ( 1999 [employer])   Q1: Little interest or pleasure in doing things 0   Q2: Feeling down, depressed or hopeless 0   PHQ-2 Score 0   Q1: Little interest or pleasure in doing things Not at all   Q2: Feeling down, depressed or hopeless Not at all   PHQ-2 Score 0     PHQ9:       10/3/2023    10:50 AM 11/14/2023    10:55 PM 1/14/2024     9:48 AM 1/28/2024     5:30 PM 2/13/2024     9:33 AM 3/27/2024    10:02 AM 4/10/2024     9:30 PM   PHQ-9 SCORE   PHQ-9 Total Score MyChart 5 (Mild depression) 2 (Minimal depression) 3 (Minimal depression) 4 (Minimal depression) 4 (Minimal depression) 3 (Minimal depression) 2 (Minimal depression)   PHQ-9 Total Score 5 2 3 4 4 3 2     GAD2:       12/13/2023     9:50 AM 1/8/2024      9:29 AM 1/28/2024     5:31 PM 2/9/2024    12:09 PM 3/11/2024     2:13 PM 3/25/2024    11:57 AM 4/10/2024     9:30 PM   NENA-2   Feeling nervous, anxious, or on edge 1 0 0 1 1 0 0   Not being able to stop or control worrying 0 0 1 1 1 0 0   NENA-2 Total Score 1 0 1 2 2 0 0     GAD7:       5/22/2023     7:45 PM   NENA-7 SCORE   Total Score 4 (minimal anxiety)   Total Score 4     CAGE-AID:       9/26/2023    11:54 AM   CAGE-AID Total Score   Total Score 0   Total Score MyChart 0 (A total score of 2 or greater is considered clinically significant)     PROMIS 10-Global Health (only subscores and total score):       9/26/2023    11:53 AM 1/8/2024     9:31 AM 1/28/2024     5:32 PM   PROMIS-10 Scores Only   Global Mental Health Score 11 11 11   Global Physical Health Score 16 17 16   PROMIS TOTAL - SUBSCORES 27 28 27     [place] Suicide Severity Rating Scale (Lifetime/Recent)       No data to display                  ASSESSMENT: Current Emotional / Mental Status (status of significant symptoms):   Risk status (Self / Other harm or suicidal ideation)   Patient denies current fears or concerns for personal safety.   Patient denies current or recent suicidal ideation or behaviors.   Patient denies current or recent homicidal ideation or behaviors.   Patient denies current or recent self injurious behavior or ideation.   Patient denies other safety concerns.   Patient reports there has been no change in risk factors since their last session.     Patient reports there has been no change in protective factors since their last session.     Recommended that patient call 911 or go to the local ED should there be a change in any of these risk factors.     Appearance:   Appropriate    Eye Contact:   Good    Psychomotor Behavior: Normal    Attitude:   Cooperative  Pleasant   Orientation:   All   Speech    Rate / Production: Normal     Volume:  Normal    Mood:    Normal empowered   Affect:    Appropriate    Thought Content:  Clear     Thought Form:  Coherent  Logical    Insight:    Good      Medication Review:     Current Outpatient Medications:     atorvastatin (LIPITOR) 10 MG tablet, Take 1 tablet (10 mg) by mouth daily, Disp: 90 tablet, Rfl: 3    blood glucose (NO BRAND SPECIFIED) test strip, Use to test blood sugar 2 times a week. To accompany: Blood Glucose Monitor Brands: per insurance., Disp: 100 strip, Rfl: 6    blood glucose monitoring (NO BRAND SPECIFIED) meter device kit, Use to test blood sugar 2 times weekly. Preferred blood glucose meter OR supplies to accompany: Blood Glucose Monitor Brands: per insurance., Disp: 1 kit, Rfl: 0    citalopram (CELEXA) 40 MG tablet, Take 1 tablet (40 mg) by mouth daily, Disp: 90 tablet, Rfl: 3    IBANdronate (BONIVA) 150 MG tablet, Take 1 tablet (150 mg) by mouth every 30 days, Disp: 12 tablet, Rfl: 1    levothyroxine (SYNTHROID/LEVOTHROID) 100 MCG tablet, Take 1 tablet (100 mcg) by mouth daily, Disp: 90 tablet, Rfl: 3    lisinopril-hydrochlorothiazide (ZESTORETIC) 10-12.5 MG tablet, Take 1 tablet by mouth daily, Disp: 90 tablet, Rfl: 3    metFORMIN (GLUCOPHAGE XR) 500 MG 24 hr tablet, Take 1 tablet (500 mg) by mouth daily (with dinner), Disp: 90 tablet, Rfl: 3    thin (NO BRAND SPECIFIED) lancets, Use with lanceting device. To accompany: Blood Glucose Monitor Brands: per insurance., Disp: 100 each, Rfl: 6       Medication Compliance:   Yes     Changes in Health Issues:   None reported     Chemical Use Review:   Substance Use: Chemical use reviewed, no active concerns identified      Tobacco Use: No current tobacco use.      Diagnosis:  No diagnosis found.        Collateral Reports Completed:   Not Applicable    PLAN: (Patient Tasks / Therapist Tasks / Other)   Engage in entertainment that is soothing over triggering. Notice distorted thoughts. Continue with building activities, address finances, seek out social options, fun outings    Yadira Etienne  [facility] 4/11/2024        _________________________________________________                                              Individual Treatment Plan    Patient's Name: Eli Wall  YOB: 1960    Date of Creation: 2/27/24  Date Treatment Plan Last Reviewed/Revised:     DSM5 Diagnoses: 300.02 (F41.1) Generalized Anxiety Disorder or Adjustment Disorders  309.28 (F43.23) With mixed anxiety and depressed mood  Psychosocial / Contextual Factors: caregiver, isolation  PROMIS (reviewed every 90 days): 28 1/8/24    Referral / Collaboration:  Referral to another professional/service is not indicated at this time..    Anticipated number of session for this episode of care:  12+ sessions  Anticipation frequency of session: Every other week  Anticipated Duration of each session: 53 or more minutes  Treatment plan will be reviewed in 90 days or when goals have been changed.       MeasurableTreatment Goal(s) related to diagnosis / functional impairment(s)  Goal 1: Patient will engage in activities to support wellbeing and improved mood    I will know I've met my goal when .        Objective #B  Patient will Increase interest, engagement, and pleasure in doing things  Decrease frequency and intensity of feeling down, depressed, hopeless  Improve quantity and quality of night time sleep / decrease daytime naps  Feel less tired and more energy during the day   Improve diet, appetite, mindful eating, and / or meal planning  Identify negative self-talk and behaviors: challenge core beliefs, myths, and actions  Improve concentration, focus, and mindfulness in daily activities   Feel less fidgety, restless or slow in daily activities / interpersonal interactions.  Status: New - Date: 2/27/24      Intervention(s)  Therapist will  teach skills, use MI and assign homework .      Patient has reviewed and agreed to the above plan.      Yadira Etienne, [facility]  February 27, 2024

## 2024-04-25 ENCOUNTER — VIRTUAL VISIT (OUTPATIENT)
Dept: PSYCHOLOGY | Facility: CLINIC | Age: 64
End: 2024-04-25
Payer: MEDICARE

## 2024-04-25 DIAGNOSIS — F43.23 ADJUSTMENT DISORDER WITH MIXED ANXIETY AND DEPRESSED MOOD: Primary | ICD-10-CM

## 2024-04-25 PROCEDURE — 90837 PSYTX W PT 60 MINUTES: CPT | Mod: 95

## 2024-04-25 NOTE — PROGRESS NOTES
[facility]                                     Progress Note    Patient Name: Eli Wall  Date: 4/25/24      Service Type: Individual      Session Start Time: 9:00 am  Session End Time: 9:53 am     Session Length: 53 min    Session #: 12    Attendees: Client attended alone    Service Modality:  Video Visit:      Provider verified identity through the following two step process.  Patient provided:  Patient is known previously to provider    Telemedicine Visit: The patient's condition can be safely assessed and treated via synchronous audio and visual telemedicine encounter.      Reason for Telemedicine Visit: Patient convenience (e.g. access to timely appointments / distance to available provider)    Originating Site (Patient Location): Patient's home    Distant Site (Provider Location): Provider Remote Setting- Home Office    Consent:  The patient/guardian has verbally consented to: the potential risks and benefits of telemedicine (video visit) versus in person care; bill my insurance or make self-payment for services provided; and responsibility for payment of non-covered services.     Patient would like the video invitation sent by:  My Chart    Mode of Communication:  Video Conference via Amwell    Distant Location (Provider):  Off-site    As the provider I attest to compliance with applicable laws and regulations related to telemedicine.    DATA  Interactive Complexity: No  Crisis: No        Progress Since Last Session (Related to Symptoms / Goals / Homework):   Symptoms: Improving mood functioning    Homework: Achieved / completed to satisfaction      Episode of Care Goals: Satisfactory progress - ACTION (Actively working towards change); Intervened by reinforcing change plan / affirming steps taken     Current / Ongoing Stressors and Concerns:  Getting involved in apartment resident action group.  continuing dementia, self-centeredness driving loneliness. Interest and fear of  finances/considering getting some employment.        Treatment Objective(s) Addressed in This Session:   participate in 1-2 activities to improve mood     Intervention:   Solution Focused: surfaced opportunity/need to identify and prioritize self care-exercise more consistently   Introduced emotion wheel, discussed uses  Motivational Interviewing  Target Behavior: disease management/lifestyle changes walking/fitness center, reading/entertainment options , focus energy on tasks including mail/bills/phone calls, financials,  seek out social options, establish accountability systems for , plan fun days. Create structure in days    Stage of Change: ACTION (Actively working towards change), preparation    MI Intervention: Expressed Empathy/Understanding, Permission to raise concern or advise, Open-ended questions, and Reflections: simple and complex     Change Talk Expressed by the Patient: Desire to change Reasons to change Taking steps    Provider Response to Change Talk: E - Evoked more info from patient about behavior change and A - Affirmed patient's thoughts, decisions, or attempts at behavior change    Assessments completed prior to visit:  The following assessments were completed by patient for this visit:  PHQ2:       5/22/2023     7:39 PM   PHQ-2 ( 1999 [employer])   Q1: Little interest or pleasure in doing things 0   Q2: Feeling down, depressed or hopeless 0   PHQ-2 Score 0   Q1: Little interest or pleasure in doing things Not at all   Q2: Feeling down, depressed or hopeless Not at all   PHQ-2 Score 0     PHQ9:       10/3/2023    10:50 AM 11/14/2023    10:55 PM 1/14/2024     9:48 AM 1/28/2024     5:30 PM 2/13/2024     9:33 AM 3/27/2024    10:02 AM 4/10/2024     9:30 PM   PHQ-9 SCORE   PHQ-9 Total Score MyChart 5 (Mild depression) 2 (Minimal depression) 3 (Minimal depression) 4 (Minimal depression) 4 (Minimal depression) 3 (Minimal depression) 2 (Minimal depression)   PHQ-9 Total Score 5 2 3 4 4 3 2      GAD2:       1/8/2024     9:29 AM 1/28/2024     5:31 PM 2/9/2024    12:09 PM 3/11/2024     2:13 PM 3/25/2024    11:57 AM 4/10/2024     9:30 PM 4/22/2024     2:23 PM   NENA-2   Feeling nervous, anxious, or on edge 0 0 1 1 0 0 1   Not being able to stop or control worrying 0 1 1 1 0 0 0   NENA-2 Total Score 0 1 2 2 0 0 1     GAD7:       5/22/2023     7:45 PM   NENA-7 SCORE   Total Score 4 (minimal anxiety)   Total Score 4     CAGE-AID:       9/26/2023    11:54 AM   CAGE-AID Total Score   Total Score 0   Total Score MyChart 0 (A total score of 2 or greater is considered clinically significant)     PROMIS 10-Global Health (only subscores and total score):       9/26/2023    11:53 AM 1/8/2024     9:31 AM 1/28/2024     5:32 PM   PROMIS-10 Scores Only   Global Mental Health Score 11 11 11   Global Physical Health Score 16 17 16   PROMIS TOTAL - SUBSCORES 27 28 27     [place] Suicide Severity Rating Scale (Lifetime/Recent)       No data to display                  ASSESSMENT: Current Emotional / Mental Status (status of significant symptoms):   Risk status (Self / Other harm or suicidal ideation)   Patient denies current fears or concerns for personal safety.   Patient denies current or recent suicidal ideation or behaviors.   Patient denies current or recent homicidal ideation or behaviors.   Patient denies current or recent self injurious behavior or ideation.   Patient denies other safety concerns.   Patient reports there has been no change in risk factors since their last session.     Patient reports there has been no change in protective factors since their last session.     Recommended that patient call 911 or go to the local ED should there be a change in any of these risk factors.     Appearance:   Appropriate    Eye Contact:   Good    Psychomotor Behavior: Normal    Attitude:   Cooperative  Pleasant   Orientation:   All   Speech    Rate / Production: Normal     Volume:  Normal    Mood:    Normal  empowered   Affect:    Appropriate    Thought Content:  Clear    Thought Form:  Coherent  Logical    Insight:    Good      Medication Review:     Current Outpatient Medications:     atorvastatin (LIPITOR) 10 MG tablet, Take 1 tablet (10 mg) by mouth daily, Disp: 90 tablet, Rfl: 3    blood glucose (NO BRAND SPECIFIED) test strip, Use to test blood sugar 2 times a week. To accompany: Blood Glucose Monitor Brands: per insurance., Disp: 100 strip, Rfl: 6    blood glucose monitoring (NO BRAND SPECIFIED) meter device kit, Use to test blood sugar 2 times weekly. Preferred blood glucose meter OR supplies to accompany: Blood Glucose Monitor Brands: per insurance., Disp: 1 kit, Rfl: 0    citalopram (CELEXA) 40 MG tablet, Take 1 tablet (40 mg) by mouth daily, Disp: 90 tablet, Rfl: 3    IBANdronate (BONIVA) 150 MG tablet, Take 1 tablet (150 mg) by mouth every 30 days, Disp: 12 tablet, Rfl: 1    levothyroxine (SYNTHROID/LEVOTHROID) 100 MCG tablet, Take 1 tablet (100 mcg) by mouth daily, Disp: 90 tablet, Rfl: 3    lisinopril-hydrochlorothiazide (ZESTORETIC) 10-12.5 MG tablet, Take 1 tablet by mouth daily, Disp: 90 tablet, Rfl: 3    metFORMIN (GLUCOPHAGE XR) 500 MG 24 hr tablet, Take 1 tablet (500 mg) by mouth daily (with dinner), Disp: 90 tablet, Rfl: 3    thin (NO BRAND SPECIFIED) lancets, Use with lanceting device. To accompany: Blood Glucose Monitor Brands: per insurance., Disp: 100 each, Rfl: 6       Medication Compliance:   Yes     Changes in Health Issues:   None reported     Chemical Use Review:   Substance Use: Chemical use reviewed, no active concerns identified      Tobacco Use: No current tobacco use.      Diagnosis:  1. Adjustment disorder with mixed anxiety and depressed mood      Collateral Reports Completed:   Not Applicable    PLAN: (Patient Tasks / Therapist Tasks / Other)   Engage in entertainment that is soothing over triggering. Notice distorted thoughts. Continue with building activities, address finances,  seek out social options, fun outings, clean kitchen before bed, use emotion wheel    Yadira Etienne, [facility] 4/25/2024        _________________________________________________                                              Individual Treatment Plan    Patient's Name: Eli Wall  YOB: 1960    Date of Creation: 2/27/24  Date Treatment Plan Last Reviewed/Revised:     DSM5 Diagnoses: 300.02 (F41.1) Generalized Anxiety Disorder or Adjustment Disorders  309.28 (F43.23) With mixed anxiety and depressed mood  Psychosocial / Contextual Factors: caregiver, isolation  PROMIS (reviewed every 90 days): 28 1/8/24    Referral / Collaboration:  Referral to another professional/service is not indicated at this time..    Anticipated number of session for this episode of care:  12+ sessions  Anticipation frequency of session: Every other week  Anticipated Duration of each session: 53 or more minutes  Treatment plan will be reviewed in 90 days or when goals have been changed.       MeasurableTreatment Goal(s) related to diagnosis / functional impairment(s)  Goal 1: Patient will engage in activities to support wellbeing and improved mood    I will know I've met my goal when .        Objective #B  Patient will Increase interest, engagement, and pleasure in doing things  Decrease frequency and intensity of feeling down, depressed, hopeless  Improve quantity and quality of night time sleep / decrease daytime naps  Feel less tired and more energy during the day   Improve diet, appetite, mindful eating, and / or meal planning  Identify negative self-talk and behaviors: challenge core beliefs, myths, and actions  Improve concentration, focus, and mindfulness in daily activities   Feel less fidgety, restless or slow in daily activities / interpersonal interactions.  Status: New - Date: 2/27/24      Intervention(s)  Therapist will  teach skills, use MI and assign homework .      Patient has reviewed and agreed to the above  plan.      Yadira Etienne, [facility]  February 27, 2024

## 2024-05-08 ENCOUNTER — VIRTUAL VISIT (OUTPATIENT)
Dept: PSYCHOLOGY | Facility: CLINIC | Age: 64
End: 2024-05-08
Payer: MEDICARE

## 2024-05-08 DIAGNOSIS — F41.1 GAD (GENERALIZED ANXIETY DISORDER): Primary | ICD-10-CM

## 2024-05-08 PROCEDURE — 90837 PSYTX W PT 60 MINUTES: CPT | Mod: 95

## 2024-05-08 NOTE — PROGRESS NOTES
[facility]                                     Progress Note    Patient Name: Eli Wall  Date: 5/8/24      Service Type: Individual      Session Start Time: 2:00 pm  Session End Time: 2:53 pm     Session Length: 53 min    Session #: 13    Attendees: Client attended alone    Service Modality:  Video Visit:      Provider verified identity through the following two step process.  Patient provided:  Patient is known previously to provider    Telemedicine Visit: The patient's condition can be safely assessed and treated via synchronous audio and visual telemedicine encounter.      Reason for Telemedicine Visit: Patient convenience (e.g. access to timely appointments / distance to available provider)    Originating Site (Patient Location): Patient's home    Distant Site (Provider Location): Provider Remote Setting- Home Office    Consent:  The patient/guardian has verbally consented to: the potential risks and benefits of telemedicine (video visit) versus in person care; bill my insurance or make self-payment for services provided; and responsibility for payment of non-covered services.     Patient would like the video invitation sent by:  My Chart    Mode of Communication:  Video Conference via Amwell    Distant Location (Provider):  Off-site    As the provider I attest to compliance with applicable laws and regulations related to telemedicine.    DATA  Interactive Complexity: No  Crisis: No      Progress Since Last Session (Related to Symptoms / Goals / Homework):   Symptoms: Improving mood functioning    Homework: Achieved / completed to satisfaction      Episode of Care Goals: Satisfactory progress - ACTION (Actively working towards change); Intervened by reinforcing change plan / affirming steps taken     Current / Ongoing Stressors and Concerns:  Getting involved in apartment resident action group,additional social ops.  continuing dementia, self-centeredness driving loneliness.  Interest and fear of finances/considering getting some employment.        Treatment Objective(s) Addressed in This Session:   participate in 1-2 activities to improve mood     Intervention:   Solution Focused: surfaced opportunity/need to identify and prioritize self care-exercise more consistently   Reviewed loving kindness, emotion wheel primary emotion of fear.  Motivational Interviewing  Target Behavior: disease management/lifestyle changes walking/fitness center, reading/entertainment options , focus energy on tasks including mail/bills/phone calls, financials,  seek out social options, establish accountability systems for , plan fun days. Create structure in days    Stage of Change: ACTION (Actively working towards change), preparation    MI Intervention: Expressed Empathy/Understanding, Permission to raise concern or advise, Open-ended questions, and Reflections: simple and complex     Change Talk Expressed by the Patient: Desire to change Reasons to change Taking steps    Provider Response to Change Talk: E - Evoked more info from patient about behavior change and A - Affirmed patient's thoughts, decisions, or attempts at behavior change    Assessments completed prior to visit:  The following assessments were completed by patient for this visit:  PHQ2:       5/22/2023     7:39 PM   PHQ-2 ( 1999 [employer])   Q1: Little interest or pleasure in doing things 0   Q2: Feeling down, depressed or hopeless 0   PHQ-2 Score 0   Q1: Little interest or pleasure in doing things Not at all   Q2: Feeling down, depressed or hopeless Not at all   PHQ-2 Score 0     PHQ9:       10/3/2023    10:50 AM 11/14/2023    10:55 PM 1/14/2024     9:48 AM 1/28/2024     5:30 PM 2/13/2024     9:33 AM 3/27/2024    10:02 AM 4/10/2024     9:30 PM   PHQ-9 SCORE   PHQ-9 Total Score [place] 5 (Mild depression) 2 (Minimal depression) 3 (Minimal depression) 4 (Minimal depression) 4 (Minimal depression) 3 (Minimal depression) 2 (Minimal  depression)   PHQ-9 Total Score 5 2 3 4 4 3 2     GAD2:       1/8/2024     9:29 AM 1/28/2024     5:31 PM 2/9/2024    12:09 PM 3/11/2024     2:13 PM 3/25/2024    11:57 AM 4/10/2024     9:30 PM 4/22/2024     2:23 PM   NENA-2   Feeling nervous, anxious, or on edge 0 0 1 1 0 0 1   Not being able to stop or control worrying 0 1 1 1 0 0 0   NENA-2 Total Score 0 1 2 2 0 0 1     GAD7:       5/22/2023     7:45 PM   NENA-7 SCORE   Total Score 4 (minimal anxiety)   Total Score 4     CAGE-AID:       9/26/2023    11:54 AM   CAGE-AID Total Score   Total Score 0   Total Score MyChart 0 (A total score of 2 or greater is considered clinically significant)     PROMIS 10-Global Health (only subscores and total score):       9/26/2023    11:53 AM 1/8/2024     9:31 AM 1/28/2024     5:32 PM 5/5/2024    11:38 AM   PROMIS-10 Scores Only   Global Mental Health Score 11 11 11 13   Global Physical Health Score 16 17 16 17   PROMIS TOTAL - SUBSCORES 27 28 27 30     [place] Suicide Severity Rating Scale (Lifetime/Recent)       No data to display                  ASSESSMENT: Current Emotional / Mental Status (status of significant symptoms):   Risk status (Self / Other harm or suicidal ideation)   Patient denies current fears or concerns for personal safety.   Patient denies current or recent suicidal ideation or behaviors.   Patient denies current or recent homicidal ideation or behaviors.   Patient denies current or recent self injurious behavior or ideation.   Patient denies other safety concerns.   Patient reports there has been no change in risk factors since their last session.     Patient reports there has been no change in protective factors since their last session.     Recommended that patient call 911 or go to the local ED should there be a change in any of these risk factors.     Appearance:   Appropriate    Eye Contact:   Good    Psychomotor Behavior: Normal    Attitude:   Cooperative  Pleasant   Orientation:   All   Speech    Rate /  Production: Normal     Volume:  Normal    Mood:    Normal empowered   Affect:    Appropriate    Thought Content:  Clear    Thought Form:  Coherent  Logical    Insight:    Good      Medication Review:     Current Outpatient Medications:     atorvastatin (LIPITOR) 10 MG tablet, Take 1 tablet (10 mg) by mouth daily, Disp: 90 tablet, Rfl: 3    blood glucose (NO BRAND SPECIFIED) test strip, Use to test blood sugar 2 times a week. To accompany: Blood Glucose Monitor Brands: per insurance., Disp: 100 strip, Rfl: 6    blood glucose monitoring (NO BRAND SPECIFIED) meter device kit, Use to test blood sugar 2 times weekly. Preferred blood glucose meter OR supplies to accompany: Blood Glucose Monitor Brands: per insurance., Disp: 1 kit, Rfl: 0    citalopram (CELEXA) 40 MG tablet, Take 1 tablet (40 mg) by mouth daily, Disp: 90 tablet, Rfl: 3    IBANdronate (BONIVA) 150 MG tablet, Take 1 tablet (150 mg) by mouth every 30 days, Disp: 12 tablet, Rfl: 1    levothyroxine (SYNTHROID/LEVOTHROID) 100 MCG tablet, Take 1 tablet (100 mcg) by mouth daily, Disp: 90 tablet, Rfl: 3    lisinopril-hydrochlorothiazide (ZESTORETIC) 10-12.5 MG tablet, Take 1 tablet by mouth daily, Disp: 90 tablet, Rfl: 3    metFORMIN (GLUCOPHAGE XR) 500 MG 24 hr tablet, Take 1 tablet (500 mg) by mouth daily (with dinner), Disp: 90 tablet, Rfl: 3    thin (NO BRAND SPECIFIED) lancets, Use with lanceting device. To accompany: Blood Glucose Monitor Brands: per insurance., Disp: 100 each, Rfl: 6       Medication Compliance:   Yes     Changes in Health Issues:   None reported     Chemical Use Review:   Substance Use: Chemical use reviewed, no active concerns identified      Tobacco Use: No current tobacco use.      Diagnosis:  1. NENA (generalized anxiety disorder)      Collateral Reports Completed:   Not Applicable    PLAN: (Patient Tasks / Therapist Tasks / Other)   Engage in entertainment that is soothing over triggering. Notice distorted thoughts. Continue with  building activities, address finances, seek out social options, fun outings, clean kitchen before bed, use emotion wheel, loving kindness, gratitude    Yadira Etienne, [facility] 5/8/2024        _________________________________________________                                              Individual Treatment Plan    Patient's Name: Eli Wall  YOB: 1960    Date of Creation: 2/27/24  Date Treatment Plan Last Reviewed/Revised: 5/8/24    DSM5 Diagnoses: 300.02 (F41.1) Generalized Anxiety Disorder or Adjustment Disorders  309.28 (F43.23) With mixed anxiety and depressed mood  Psychosocial / Contextual Factors: caregiver, isolation  PROMIS (reviewed every 90 days): 28 1/8/24,     Referral / Collaboration:  Referral to another professional/service is not indicated at this time..    Anticipated number of session for this episode of care:  12+ sessions  Anticipation frequency of session: Every other week  Anticipated Duration of each session: 53 or more minutes  Treatment plan will be reviewed in 90 days or when goals have been changed.       MeasurableTreatment Goal(s) related to diagnosis / functional impairment(s)  Goal 1: Patient will engage in activities to support wellbeing and improved mood    I will know I've met my goal when .        Objective #B  Patient will Increase interest, engagement, and pleasure in doing things  Decrease frequency and intensity of feeling down, depressed, hopeless  Improve quantity and quality of night time sleep / decrease daytime naps  Feel less tired and more energy during the day   Improve diet, appetite, mindful eating, and / or meal planning  Identify negative self-talk and behaviors: challenge core beliefs, myths, and actions  Improve concentration, focus, and mindfulness in daily activities   Feel less fidgety, restless or slow in daily activities / interpersonal interactions.  Status: Continued - Date(s): May 8,2024    Intervention(s)  Therapist will  teach skills,  use MI and assign homework .      Patient has reviewed and agreed to the above plan.      Yadira Etienne, [facility]  May 8,2024

## 2024-05-22 ENCOUNTER — MYC REFILL (OUTPATIENT)
Dept: FAMILY MEDICINE | Facility: CLINIC | Age: 64
End: 2024-05-22
Payer: MEDICARE

## 2024-05-22 DIAGNOSIS — E78.5 HYPERLIPIDEMIA LDL GOAL <70: ICD-10-CM

## 2024-05-22 DIAGNOSIS — E11.9 TYPE 2 DIABETES MELLITUS WITHOUT COMPLICATION, WITHOUT LONG-TERM CURRENT USE OF INSULIN (H): ICD-10-CM

## 2024-05-22 DIAGNOSIS — M81.6 LOCALIZED OSTEOPOROSIS WITHOUT CURRENT PATHOLOGICAL FRACTURE: ICD-10-CM

## 2024-05-22 DIAGNOSIS — I10 PRIMARY HYPERTENSION: ICD-10-CM

## 2024-05-22 DIAGNOSIS — E03.9 HYPOTHYROIDISM, UNSPECIFIED TYPE: ICD-10-CM

## 2024-05-22 RX ORDER — LISINOPRIL/HYDROCHLOROTHIAZIDE 10-12.5 MG
1 TABLET ORAL DAILY
Qty: 90 TABLET | Refills: 0 | Status: SHIPPED | OUTPATIENT
Start: 2024-05-22 | End: 2024-09-18

## 2024-05-22 RX ORDER — ATORVASTATIN CALCIUM 10 MG/1
10 TABLET, FILM COATED ORAL DAILY
Qty: 90 TABLET | Refills: 0 | Status: SHIPPED | OUTPATIENT
Start: 2024-05-22 | End: 2024-09-18

## 2024-05-22 RX ORDER — IBANDRONATE SODIUM 150 MG/1
1 TABLET, FILM COATED ORAL
Qty: 12 TABLET | Refills: 0 | Status: SHIPPED | OUTPATIENT
Start: 2024-05-22

## 2024-05-22 RX ORDER — LEVOTHYROXINE SODIUM 100 UG/1
100 TABLET ORAL DAILY
Qty: 90 TABLET | Refills: 0 | Status: SHIPPED | OUTPATIENT
Start: 2024-05-22

## 2024-05-23 RX ORDER — METFORMIN HCL 500 MG
500 TABLET, EXTENDED RELEASE 24 HR ORAL
Qty: 90 TABLET | Refills: 0 | Status: SHIPPED | OUTPATIENT
Start: 2024-05-23 | End: 2024-09-18

## 2024-06-04 ENCOUNTER — VIRTUAL VISIT (OUTPATIENT)
Dept: PSYCHOLOGY | Facility: CLINIC | Age: 64
End: 2024-06-04
Payer: MEDICARE

## 2024-06-04 DIAGNOSIS — F41.1 GAD (GENERALIZED ANXIETY DISORDER): Primary | ICD-10-CM

## 2024-06-04 PROCEDURE — 90837 PSYTX W PT 60 MINUTES: CPT | Mod: 95

## 2024-06-04 ASSESSMENT — PATIENT HEALTH QUESTIONNAIRE - PHQ9
SUM OF ALL RESPONSES TO PHQ QUESTIONS 1-9: 3
10. IF YOU CHECKED OFF ANY PROBLEMS, HOW DIFFICULT HAVE THESE PROBLEMS MADE IT FOR YOU TO DO YOUR WORK, TAKE CARE OF THINGS AT HOME, OR GET ALONG WITH OTHER PEOPLE: NOT DIFFICULT AT ALL
SUM OF ALL RESPONSES TO PHQ QUESTIONS 1-9: 3

## 2024-06-04 NOTE — PROGRESS NOTES
[facility]                                     Progress Note    Patient Name: Eli Wall  Date: 6/4/24      Service Type: Individual      Session Start Time: 9:00 am  Session End Time: 9:53 am     Session Length: 53 min    Session #: 14    Attendees: Client attended alone    Service Modality:  Video Visit:      Provider verified identity through the following two step process.  Patient provided:  Patient is known previously to provider    Telemedicine Visit: The patient's condition can be safely assessed and treated via synchronous audio and visual telemedicine encounter.      Reason for Telemedicine Visit: Patient convenience (e.g. access to timely appointments / distance to available provider)    Originating Site (Patient Location): Patient's home    Distant Site (Provider Location): Provider Remote Setting- Home Office    Consent:  The patient/guardian has verbally consented to: the potential risks and benefits of telemedicine (video visit) versus in person care; bill my insurance or make self-payment for services provided; and responsibility for payment of non-covered services.     Patient would like the video invitation sent by:  My Chart    Mode of Communication:  Video Conference via Amwell    Distant Location (Provider):  Off-site    As the provider I attest to compliance with applicable laws and regulations related to telemedicine.    DATA  Interactive Complexity: No  Crisis: No      Progress Since Last Session (Related to Symptoms / Goals / Homework):   Symptoms: Improving mood functioning    Homework: Achieved / completed to satisfaction      Episode of Care Goals: Satisfactory progress - ACTION (Actively working towards change); Intervened by reinforcing change plan / affirming steps taken     Current / Ongoing Stressors and Concerns:  Getting involved in apartment resident group, additional social ops.Isolation from family, some regrets about decision to move.   continuing dementia, self-centeredness driving loneliness. Interest and fear of finances/considering getting some employment.        Treatment Objective(s) Addressed in This Session:   participate in 1-2 activities to improve mood  Patient will Increase interest, engagement, and pleasure in doing things  Decrease frequency and intensity of feeling down, depressed, hopeless  Improve quantity and quality of night time sleep / decrease daytime naps  Feel less tired and more energy during the day   Improve diet, appetite, mindful eating, and / or meal planning  Identify negative self-talk and behaviors: challenge core beliefs, myths, and actions  Improve concentration, focus, and mindfulness in daily activities   Feel less fidgety, restless or slow in daily activities / interpersonal interactions.     Intervention:   Solution Focused: surfaced opportunity/need to identify and prioritize self care-exercise more consistently   Reviewed loving kindness, explored origins of fear as primary emotion from childhood, mother's SPMI.  Motivational Interviewing  Target Behavior: disease management/lifestyle changes walking/fitness center, reading/entertainment options , focus energy on tasks including mail/bills/phone calls, financials,  seek out social options, establish accountability systems for , plan fun days. Create structure in days    Stage of Change: ACTION (Actively working towards change)    MI Intervention: Expressed Empathy/Understanding, Permission to raise concern or advise, Open-ended questions, and Reflections: simple and complex     Change Talk Expressed by the Patient: Desire to change Reasons to change Taking steps    Provider Response to Change Talk: E - Evoked more info from patient about behavior change and A - Affirmed patient's thoughts, decisions, or attempts at behavior change    Assessments completed prior to visit:  The following assessments were completed by patient for this visit:  PHQ2:        5/22/2023     7:39 PM   PHQ-2 ( 1999 [employer])   Q1: Little interest or pleasure in doing things 0   Q2: Feeling down, depressed or hopeless 0   PHQ-2 Score 0   Q1: Little interest or pleasure in doing things Not at all   Q2: Feeling down, depressed or hopeless Not at all   PHQ-2 Score 0     PHQ9:       11/14/2023    10:55 PM 1/14/2024     9:48 AM 1/28/2024     5:30 PM 2/13/2024     9:33 AM 3/27/2024    10:02 AM 4/10/2024     9:30 PM 6/4/2024     8:51 AM   PHQ-9 SCORE   PHQ-9 Total Score MyChart 2 (Minimal depression) 3 (Minimal depression) 4 (Minimal depression) 4 (Minimal depression) 3 (Minimal depression) 2 (Minimal depression) 3 (Minimal depression)   PHQ-9 Total Score 2 3 4 4 3 2 3     GAD2:       1/28/2024     5:31 PM 2/9/2024    12:09 PM 3/11/2024     2:13 PM 3/25/2024    11:57 AM 4/10/2024     9:30 PM 4/22/2024     2:23 PM 6/2/2024     4:08 PM   NENA-2   Feeling nervous, anxious, or on edge 0 1 1 0 0 1 0   Not being able to stop or control worrying 1 1 1 0 0 0 0   NENA-2 Total Score 1 2 2 0 0 1 0     GAD7:       5/22/2023     7:45 PM   NENA-7 SCORE   Total Score 4 (minimal anxiety)   Total Score 4     CAGE-AID:       9/26/2023    11:54 AM   CAGE-AID Total Score   Total Score 0   Total Score MyChart 0 (A total score of 2 or greater is considered clinically significant)     PROMIS 10-Global Health (only subscores and total score):       9/26/2023    11:53 AM 1/8/2024     9:31 AM 1/28/2024     5:32 PM 5/5/2024    11:38 AM   PROMIS-10 Scores Only   Global Mental Health Score 11 11 11 13   Global Physical Health Score 16 17 16 17   PROMIS TOTAL - SUBSCORES 27 28 27 30     [place] Suicide Severity Rating Scale (Lifetime/Recent)       No data to display                  ASSESSMENT: Current Emotional / Mental Status (status of significant symptoms):   Risk status (Self / Other harm or suicidal ideation)   Patient denies current fears or concerns for personal safety.   Patient denies current or recent suicidal ideation  or behaviors.   Patient denies current or recent homicidal ideation or behaviors.   Patient denies current or recent self injurious behavior or ideation.   Patient denies other safety concerns.   Patient reports there has been no change in risk factors since their last session.     Patient reports there has been no change in protective factors since their last session.     Recommended that patient call 911 or go to the local ED should there be a change in any of these risk factors.     Appearance:   Appropriate    Eye Contact:   Good    Psychomotor Behavior: Normal    Attitude:   Cooperative  Pleasant   Orientation:   All   Speech    Rate / Production: Normal     Volume:  Normal    Mood:    Normal empowered   Affect:    Appropriate    Thought Content:  Clear    Thought Form:  Coherent  Logical    Insight:    Good      Medication Review:     Current Outpatient Medications:     atorvastatin (LIPITOR) 10 MG tablet, TAKE 1 TABLET(10 MG) BY MOUTH DAILY, Disp: 90 tablet, Rfl: 0    blood glucose (NO BRAND SPECIFIED) test strip, Use to test blood sugar 2 times a week. To accompany: Blood Glucose Monitor Brands: per insurance., Disp: 100 strip, Rfl: 6    blood glucose monitoring (NO BRAND SPECIFIED) meter device kit, Use to test blood sugar 2 times weekly. Preferred blood glucose meter OR supplies to accompany: Blood Glucose Monitor Brands: per insurance., Disp: 1 kit, Rfl: 0    citalopram (CELEXA) 40 MG tablet, Take 1 tablet (40 mg) by mouth daily, Disp: 90 tablet, Rfl: 3    IBANdronate (BONIVA) 150 MG tablet, TAKE 1 TABLET BY MOUTH EVERY 30 DAYS, Disp: 12 tablet, Rfl: 0    levothyroxine (SYNTHROID/LEVOTHROID) 100 MCG tablet, TAKE 1 TABLET(100 MCG) BY MOUTH DAILY, Disp: 90 tablet, Rfl: 0    lisinopril-hydrochlorothiazide (ZESTORETIC) 10-12.5 MG tablet, TAKE 1 TABLET BY MOUTH DAILY, Disp: 90 tablet, Rfl: 0    metFORMIN (GLUCOPHAGE XR) 500 MG 24 hr tablet, Take 1 tablet (500 mg) by mouth daily (with dinner), Disp: 90 tablet,  Rfl: 0    thin (NO BRAND SPECIFIED) lancets, Use with lanceting device. To accompany: Blood Glucose Monitor Brands: per insurance., Disp: 100 each, Rfl: 6       Medication Compliance:   Yes     Changes in Health Issues:   None reported     Chemical Use Review:   Substance Use: Chemical use reviewed, no active concerns identified      Tobacco Use: No current tobacco use.      Diagnosis:  1. NENA (generalized anxiety disorder)      Collateral Reports Completed:   Not Applicable    PLAN: (Patient Tasks / Therapist Tasks / Other)   Engage in entertainment that is soothing over triggering. Notice distorted thoughts. Continue with building activities, address finances, seek out social options, fun outings, clean kitchen before bed, use emotion wheel, loving kindness, gratitude toward self. Tap into business Eli self to activate on movie night    Yadira Etienne, [facility] 6/4/2024        _________________________________________________                                              Individual Treatment Plan    Patient's Name: Eli Wall  YOB: 1960    Date of Creation: 2/27/24  Date Treatment Plan Last Reviewed/Revised: 5/8/24    DSM5 Diagnoses: 300.02 (F41.1) Generalized Anxiety Disorder or Adjustment Disorders  309.28 (F43.23) With mixed anxiety and depressed mood  Psychosocial / Contextual Factors: caregiver, isolation  PROMIS (reviewed every 90 days): 28 1/8/24,     Referral / Collaboration:  Referral to another professional/service is not indicated at this time..    Anticipated number of session for this episode of care:  12+ sessions  Anticipation frequency of session: Every other week  Anticipated Duration of each session: 53 or more minutes  Treatment plan will be reviewed in 90 days or when goals have been changed.       MeasurableTreatment Goal(s) related to diagnosis / functional impairment(s)  Goal 1: Patient will engage in activities to support wellbeing and improved mood    I will know I've met  my goal when .        Objective #B  Patient will Increase interest, engagement, and pleasure in doing things  Decrease frequency and intensity of feeling down, depressed, hopeless  Improve quantity and quality of night time sleep / decrease daytime naps  Feel less tired and more energy during the day   Improve diet, appetite, mindful eating, and / or meal planning  Identify negative self-talk and behaviors: challenge core beliefs, myths, and actions  Improve concentration, focus, and mindfulness in daily activities   Feel less fidgety, restless or slow in daily activities / interpersonal interactions.  Status: Continued - Date(s): May 8,2024    Intervention(s)  Therapist will  teach skills, use MI and assign homework .      Patient has reviewed and agreed to the above plan.      Yadira Etienne [facility]  May 8,2024

## 2024-07-16 ENCOUNTER — VIRTUAL VISIT (OUTPATIENT)
Dept: PSYCHOLOGY | Facility: CLINIC | Age: 64
End: 2024-07-16
Payer: MEDICARE

## 2024-07-16 DIAGNOSIS — F41.1 GAD (GENERALIZED ANXIETY DISORDER): Primary | ICD-10-CM

## 2024-07-16 PROCEDURE — 90837 PSYTX W PT 60 MINUTES: CPT | Mod: 95

## 2024-07-16 NOTE — PROGRESS NOTES
[facility]                                     Progress Note    Patient Name: Eli Wall  Date: 7/16/24      Service Type: Individual      Session Start Time: 9:00 am  Session End Time: 9:53 am     Session Length: 53 min    Session #: 15    Attendees: Client attended alone    Service Modality:  Video Visit:      Provider verified identity through the following two step process.  Patient provided:  Patient is known previously to provider    Telemedicine Visit: The patient's condition can be safely assessed and treated via synchronous audio and visual telemedicine encounter.      Reason for Telemedicine Visit: Patient convenience (e.g. access to timely appointments / distance to available provider)    Originating Site (Patient Location): Patient's home    Distant Site (Provider Location): Provider Remote Setting- Home Office    Consent:  The patient/guardian has verbally consented to: the potential risks and benefits of telemedicine (video visit) versus in person care; bill my insurance or make self-payment for services provided; and responsibility for payment of non-covered services.     Patient would like the video invitation sent by:  My Chart    Mode of Communication:  Video Conference via Amwell    Distant Location (Provider):  Off-site    As the provider I attest to compliance with applicable laws and regulations related to telemedicine.    DATA  Interactive Complexity: No  Crisis: No      Progress Since Last Session (Related to Symptoms / Goals / Homework):   Symptoms: Improving mood functioning    Homework: Achieved / completed to satisfaction      Episode of Care Goals: Satisfactory progress - ACTION (Actively working towards change); Intervened by reinforcing change plan / affirming steps taken     Current / Ongoing Stressors and Concerns:  Getting involved in apartment resident group, additional social ops including daily coffee. Growing contact with family.  continuing  dementia, self-centeredness driving loneliness. Concerns about building management     Treatment Objective(s) Addressed in This Session:   participate in 1-2 activities to improve mood  Patient will Increase interest, engagement, and pleasure in doing things  Decrease frequency and intensity of feeling down, depressed, hopeless  Improve quantity and quality of night time sleep / decrease daytime naps  Feel less tired and more energy during the day   Improve diet, appetite, mindful eating, and / or meal planning  Identify negative self-talk and behaviors: challenge core beliefs, myths, and actions  Improve concentration, focus, and mindfulness in daily activities   Feel less fidgety, restless or slow in daily activities / interpersonal interactions.     Intervention:   Solution Focused: surfaced opportunity/need to identify and prioritize self care-exercise more consistently       Motivational Interviewing  Target Behavior: disease management/lifestyle changes walking/fitness center, reading/entertainment options , focus energy on tasks including mail/bills/phone calls, financials,  seek out social options, establish accountability systems for , plan fun days. Create structure in days, engage in coffee group    Stage of Change: ACTION (Actively working towards change)    MI Intervention: Expressed Empathy/Understanding, Permission to raise concern or advise, Open-ended questions, and Reflections: simple and complex     Change Talk Expressed by the Patient: Desire to change Reasons to change Taking steps    Provider Response to Change Talk: E - Evoked more info from patient about behavior change and A - Affirmed patient's thoughts, decisions, or attempts at behavior change    Assessments completed prior to visit:  The following assessments were completed by patient for this visit:  PHQ2:       5/22/2023     7:39 PM   PHQ-2 ( 1999 [employer])   Q1: Little interest or pleasure in doing things 0   Q2: Feeling down,  depressed or hopeless 0   PHQ-2 Score 0   Q1: Little interest or pleasure in doing things Not at all   Q2: Feeling down, depressed or hopeless Not at all   PHQ-2 Score 0     PHQ9:       11/14/2023    10:55 PM 1/14/2024     9:48 AM 1/28/2024     5:30 PM 2/13/2024     9:33 AM 3/27/2024    10:02 AM 4/10/2024     9:30 PM 6/4/2024     8:51 AM   PHQ-9 SCORE   PHQ-9 Total Score MyChart 2 (Minimal depression) 3 (Minimal depression) 4 (Minimal depression) 4 (Minimal depression) 3 (Minimal depression) 2 (Minimal depression) 3 (Minimal depression)   PHQ-9 Total Score 2 3 4 4 3 2 3     GAD2:       2/9/2024    12:09 PM 3/11/2024     2:13 PM 3/25/2024    11:57 AM 4/10/2024     9:30 PM 4/22/2024     2:23 PM 6/2/2024     4:08 PM 7/12/2024     1:15 PM   NENA-2   Feeling nervous, anxious, or on edge 1 1 0 0 1 0 0   Not being able to stop or control worrying 1 1 0 0 0 0 0   NENA-2 Total Score 2 2 0 0 1 0 0     GAD7:       5/22/2023     7:45 PM   NENA-7 SCORE   Total Score 4 (minimal anxiety)   Total Score 4     CAGE-AID:       9/26/2023    11:54 AM   CAGE-AID Total Score   Total Score 0   Total Score MyChart 0 (A total score of 2 or greater is considered clinically significant)     PROMIS 10-Global Health (only subscores and total score):       9/26/2023    11:53 AM 1/8/2024     9:31 AM 1/28/2024     5:32 PM 5/5/2024    11:38 AM   PROMIS-10 Scores Only   Global Mental Health Score 11 11 11 13   Global Physical Health Score 16 17 16 17   PROMIS TOTAL - SUBSCORES 27 28 27 30     [place] Suicide Severity Rating Scale (Lifetime/Recent)       No data to display                  ASSESSMENT: Current Emotional / Mental Status (status of significant symptoms):   Risk status (Self / Other harm or suicidal ideation)   Patient denies current fears or concerns for personal safety.   Patient denies current or recent suicidal ideation or behaviors.   Patient denies current or recent homicidal ideation or behaviors.   Patient denies current or recent  self injurious behavior or ideation.   Patient denies other safety concerns.   Patient reports there has been no change in risk factors since their last session.     Patient reports there has been no change in protective factors since their last session.     Recommended that patient call 911 or go to the local ED should there be a change in any of these risk factors.     Appearance:   Appropriate    Eye Contact:   Good    Psychomotor Behavior: Normal    Attitude:   Cooperative  Pleasant   Orientation:   All   Speech    Rate / Production: Normal     Volume:  Normal    Mood:    Normal empowered   Affect:    Appropriate    Thought Content:  Clear    Thought Form:  Coherent  Logical    Insight:    Good      Medication Review:     Current Outpatient Medications:     atorvastatin (LIPITOR) 10 MG tablet, TAKE 1 TABLET(10 MG) BY MOUTH DAILY, Disp: 90 tablet, Rfl: 0    blood glucose (NO BRAND SPECIFIED) test strip, Use to test blood sugar 2 times a week. To accompany: Blood Glucose Monitor Brands: per insurance., Disp: 100 strip, Rfl: 6    blood glucose monitoring (NO BRAND SPECIFIED) meter device kit, Use to test blood sugar 2 times weekly. Preferred blood glucose meter OR supplies to accompany: Blood Glucose Monitor Brands: per insurance., Disp: 1 kit, Rfl: 0    citalopram (CELEXA) 40 MG tablet, Take 1 tablet (40 mg) by mouth daily, Disp: 90 tablet, Rfl: 3    IBANdronate (BONIVA) 150 MG tablet, TAKE 1 TABLET BY MOUTH EVERY 30 DAYS, Disp: 12 tablet, Rfl: 0    levothyroxine (SYNTHROID/LEVOTHROID) 100 MCG tablet, TAKE 1 TABLET(100 MCG) BY MOUTH DAILY, Disp: 90 tablet, Rfl: 0    lisinopril-hydrochlorothiazide (ZESTORETIC) 10-12.5 MG tablet, TAKE 1 TABLET BY MOUTH DAILY, Disp: 90 tablet, Rfl: 0    metFORMIN (GLUCOPHAGE XR) 500 MG 24 hr tablet, Take 1 tablet (500 mg) by mouth daily (with dinner), Disp: 90 tablet, Rfl: 0    thin (NO BRAND SPECIFIED) lancets, Use with lanceting device. To accompany: Blood Glucose Monitor Brands:  per insurance., Disp: 100 each, Rfl: 6       Medication Compliance:   Yes     Changes in Health Issues:   None reported     Chemical Use Review:   Substance Use: Chemical use reviewed, no active concerns identified      Tobacco Use: No current tobacco use.      Diagnosis:  1. NENA (generalized anxiety disorder)      Collateral Reports Completed:   Not Applicable    PLAN: (Patient Tasks / Therapist Tasks / Other)   Engage in entertainment that is soothing over triggering. Notice distorted thoughts. Continue with building activities, address finances, seek out social options, fun outings, clean kitchen before bed, use emotion wheel, loving kindness, gratitude toward self. Tap into business Eli self to activate on movie night, join [email or web address] group    Yadira Etienne, [facility] 7/16/2024        _________________________________________________                                              Individual Treatment Plan    Patient's Name: Eli Wall  YOB: 1960    Date of Creation: 2/27/24  Date Treatment Plan Last Reviewed/Revised: 5/8/24    DSM5 Diagnoses: 300.02 (F41.1) Generalized Anxiety Disorder or Adjustment Disorders  309.28 (F43.23) With mixed anxiety and depressed mood  Psychosocial / Contextual Factors: caregiver, isolation  PROMIS (reviewed every 90 days): 28 1/8/24,     Referral / Collaboration:  Referral to another professional/service is not indicated at this time..    Anticipated number of session for this episode of care:  12+ sessions  Anticipation frequency of session: Every other week  Anticipated Duration of each session: 53 or more minutes  Treatment plan will be reviewed in 90 days or when goals have been changed.       MeasurableTreatment Goal(s) related to diagnosis / functional impairment(s)  Goal 1: Patient will engage in activities to support wellbeing and improved mood    I will know I've met my goal when .        Objective #B  Patient will Increase interest, engagement, and pleasure in  doing things  Decrease frequency and intensity of feeling down, depressed, hopeless  Improve quantity and quality of night time sleep / decrease daytime naps  Feel less tired and more energy during the day   Improve diet, appetite, mindful eating, and / or meal planning  Identify negative self-talk and behaviors: challenge core beliefs, myths, and actions  Improve concentration, focus, and mindfulness in daily activities   Feel less fidgety, restless or slow in daily activities / interpersonal interactions.  Status: Continued - Date(s): May 8,2024    Intervention(s)  Therapist will  teach skills, use MI and assign homework .      Patient has reviewed and agreed to the above plan.      DORIS Busch  May 8,2024

## 2024-08-04 ENCOUNTER — HEALTH MAINTENANCE LETTER (OUTPATIENT)
Age: 64
End: 2024-08-04

## 2024-08-20 ENCOUNTER — VIRTUAL VISIT (OUTPATIENT)
Dept: PSYCHOLOGY | Facility: CLINIC | Age: 64
End: 2024-08-20
Payer: MEDICARE

## 2024-08-20 DIAGNOSIS — F41.1 GAD (GENERALIZED ANXIETY DISORDER): Primary | ICD-10-CM

## 2024-08-20 PROCEDURE — 90837 PSYTX W PT 60 MINUTES: CPT | Mod: 95

## 2024-08-20 ASSESSMENT — PATIENT HEALTH QUESTIONNAIRE - PHQ9
SUM OF ALL RESPONSES TO PHQ QUESTIONS 1-9: 3
SUM OF ALL RESPONSES TO PHQ QUESTIONS 1-9: 3
10. IF YOU CHECKED OFF ANY PROBLEMS, HOW DIFFICULT HAVE THESE PROBLEMS MADE IT FOR YOU TO DO YOUR WORK, TAKE CARE OF THINGS AT HOME, OR GET ALONG WITH OTHER PEOPLE: NOT DIFFICULT AT ALL

## 2024-08-20 NOTE — PROGRESS NOTES
[facility]                                     Progress Note    Patient Name: Eli Wall  Date: 8/20/24      Service Type: Individual      Session Start Time: 10:00 am  Session End Time: 10:53 am     Session Length: 53 min    Session #: 16    Attendees: Client attended alone    Service Modality:  Video Visit:      Provider verified identity through the following two step process.  Patient provided:  Patient is known previously to provider    Telemedicine Visit: The patient's condition can be safely assessed and treated via synchronous audio and visual telemedicine encounter.      Reason for Telemedicine Visit: Patient convenience (e.g. access to timely appointments / distance to available provider)    Originating Site (Patient Location): Patient's home    Distant Site (Provider Location): Provider Remote Setting- Home Office    Consent:  The patient/guardian has verbally consented to: the potential risks and benefits of telemedicine (video visit) versus in person care; bill my insurance or make self-payment for services provided; and responsibility for payment of non-covered services.     Patient would like the video invitation sent by:  My Chart    Mode of Communication:  Video Conference via [employer]    Distant Location (Provider):  Off-site    As the provider I attest to compliance with applicable laws and regulations related to telemedicine.    DATA  Interactive Complexity: No  Crisis: No      Progress Since Last Session (Related to Symptoms / Goals / Homework):   Symptoms: Improving mood functioning    Homework: Achieved / completed to satisfaction      Episode of Care Goals: Satisfactory progress - ACTION (Actively working towards change); Intervened by reinforcing change plan / affirming steps taken     Current / Ongoing Stressors and Concerns:  Inconsistent  involvement in apartment resident group, daily coffee. Growing contact with family.  continuing dementia,  self-centeredness driving loneliness. Concerns about building management     Treatment Objective(s) Addressed in This Session:   participate in 1-2 activities to improve mood  Patient will Increase interest, engagement, and pleasure in doing things  Decrease frequency and intensity of feeling down, depressed, hopeless  Improve quantity and quality of night time sleep / decrease daytime naps  Feel less tired and more energy during the day   Improve diet, appetite, mindful eating, and / or meal planning  Identify negative self-talk and behaviors: challenge core beliefs, myths, and actions  Improve concentration, focus, and mindfulness in daily activities   Feel less fidgety, restless or slow in daily activities / interpersonal interactions.     Intervention:   Solution Focused: surfaced opportunity/need to identify and prioritize self care-exercise more consistently   Discussed opposite action, taking small steps and breaks in order to make progress    Motivational Interviewing  Target Behavior: disease management/lifestyle changes walking/fitness center, reading/entertainment options , focus energy on tasks including mail/bills/phone calls, financials,  seek out social options, establish accountability systems for , plan fun days. Create structure in days, engage in coffee group    Stage of Change: RELAPSE (Returned to unhealthy behavior)    MI Intervention: Expressed Empathy/Understanding, Permission to raise concern or advise, Open-ended questions, and Reflections: simple and complex     Change Talk Expressed by the Patient: Desire to change Reasons to change Taking steps    Provider Response to Change Talk: E - Evoked more info from patient about behavior change and A - Affirmed patient's thoughts, decisions, or attempts at behavior change    Assessments completed prior to visit:  The following assessments were completed by patient for this visit:  PHQ2:       5/22/2023     7:39 PM   PHQ-2 ( 1999 [employer])    Q1: Little interest or pleasure in doing things 0   Q2: Feeling down, depressed or hopeless 0   PHQ-2 Score 0   Q1: Little interest or pleasure in doing things Not at all   Q2: Feeling down, depressed or hopeless Not at all   PHQ-2 Score 0     PHQ9:       1/14/2024     9:48 AM 1/28/2024     5:30 PM 2/13/2024     9:33 AM 3/27/2024    10:02 AM 4/10/2024     9:30 PM 6/4/2024     8:51 AM 8/20/2024     9:48 AM   PHQ-9 SCORE   PHQ-9 Total Score MyChart 3 (Minimal depression) 4 (Minimal depression) 4 (Minimal depression) 3 (Minimal depression) 2 (Minimal depression) 3 (Minimal depression) 3 (Minimal depression)   PHQ-9 Total Score 3 4 4 3 2 3 3     GAD2:       3/11/2024     2:13 PM 3/25/2024    11:57 AM 4/10/2024     9:30 PM 4/22/2024     2:23 PM 6/2/2024     4:08 PM 7/12/2024     1:15 PM 8/17/2024     9:08 PM   NENA-2   Feeling nervous, anxious, or on edge 1 0 0 1 0 0 0   Not being able to stop or control worrying 1 0 0 0 0 0 0   NENA-2 Total Score 2 0 0 1 0 0 0     GAD7:       5/22/2023     7:45 PM   NENA-7 SCORE   Total Score 4 (minimal anxiety)   Total Score 4     CAGE-AID:       9/26/2023    11:54 AM   CAGE-AID Total Score   Total Score 0   Total Score MyChart 0 (A total score of 2 or greater is considered clinically significant)     PROMIS 10-Global Health (only subscores and total score):       9/26/2023    11:53 AM 1/8/2024     9:31 AM 1/28/2024     5:32 PM 5/5/2024    11:38 AM 8/17/2024     9:09 PM   PROMIS-10 Scores Only   Global Mental Health Score 11 11 11 13 12   Global Physical Health Score 16 17 16 17 16   PROMIS TOTAL - SUBSCORES 27 28 27 30 28     [place] Suicide Severity Rating Scale (Lifetime/Recent)       No data to display                  ASSESSMENT: Current Emotional / Mental Status (status of significant symptoms):   Risk status (Self / Other harm or suicidal ideation)   Patient denies current fears or concerns for personal safety.   Patient denies current or recent suicidal ideation or  behaviors.   Patient denies current or recent homicidal ideation or behaviors.   Patient denies current or recent self injurious behavior or ideation.   Patient denies other safety concerns.   Patient reports there has been no change in risk factors since their last session.     Patient reports there has been no change in protective factors since their last session.     Recommended that patient call 911 or go to the local ED should there be a change in any of these risk factors.     Appearance:   Appropriate    Eye Contact:   Good    Psychomotor Behavior: Normal    Attitude:   Cooperative  Pleasant   Orientation:   All   Speech    Rate / Production: Normal     Volume:  Normal    Mood:    Normal empowered   Affect:    Appropriate    Thought Content:  Clear    Thought Form:  Coherent  Logical    Insight:    Good      Medication Review:     Current Outpatient Medications:     atorvastatin (LIPITOR) 10 MG tablet, TAKE 1 TABLET(10 MG) BY MOUTH DAILY, Disp: 90 tablet, Rfl: 0    blood glucose (NO BRAND SPECIFIED) test strip, Use to test blood sugar 2 times a week. To accompany: Blood Glucose Monitor Brands: per insurance., Disp: 100 strip, Rfl: 6    blood glucose monitoring (NO BRAND SPECIFIED) meter device kit, Use to test blood sugar 2 times weekly. Preferred blood glucose meter OR supplies to accompany: Blood Glucose Monitor Brands: per insurance., Disp: 1 kit, Rfl: 0    citalopram (CELEXA) 40 MG tablet, Take 1 tablet (40 mg) by mouth daily, Disp: 90 tablet, Rfl: 3    IBANdronate (BONIVA) 150 MG tablet, TAKE 1 TABLET BY MOUTH EVERY 30 DAYS, Disp: 12 tablet, Rfl: 0    levothyroxine (SYNTHROID/LEVOTHROID) 100 MCG tablet, TAKE 1 TABLET(100 MCG) BY MOUTH DAILY, Disp: 90 tablet, Rfl: 0    lisinopril-hydrochlorothiazide (ZESTORETIC) 10-12.5 MG tablet, TAKE 1 TABLET BY MOUTH DAILY, Disp: 90 tablet, Rfl: 0    metFORMIN (GLUCOPHAGE XR) 500 MG 24 hr tablet, Take 1 tablet (500 mg) by mouth daily (with dinner), Disp: 90 tablet,  Rfl: 0    thin (NO BRAND SPECIFIED) lancets, Use with lanceting device. To accompany: Blood Glucose Monitor Brands: per insurance., Disp: 100 each, Rfl: 6       Medication Compliance:   Yes     Changes in Health Issues:   None reported     Chemical Use Review:   Substance Use: Chemical use reviewed, no active concerns identified      Tobacco Use: No current tobacco use.      Diagnosis:  1. NENA (generalized anxiety disorder)      Collateral Reports Completed:   Not Applicable    PLAN: (Patient Tasks / Therapist Tasks / Other)   Engage in entertainment that is soothing over triggering. Notice distorted thoughts. Continue with building activities, address finances, seek out social options, fun outings, clean kitchen before bed, use emotion wheel, loving kindness, gratitude toward self. Tap into business Eli self to activate on movie night, join [employer] group    Yadira Etienne, LICSW 8/20/2024        _________________________________________________                                              Individual Treatment Plan    Patient's Name: Eli Wall  YOB: 1960    Date of Creation: 2/27/24  Date Treatment Plan Last Reviewed/Revised: 5/8/24    DSM5 Diagnoses: 300.02 (F41.1) Generalized Anxiety Disorder or Adjustment Disorders  309.28 (F43.23) With mixed anxiety and depressed mood  Psychosocial / Contextual Factors: caregiver, isolation  PROMIS (reviewed every 90 days): 28 1/8/24,     Referral / Collaboration:  Referral to another professional/service is not indicated at this time..    Anticipated number of session for this episode of care:  12+ sessions  Anticipation frequency of session: Every other week  Anticipated Duration of each session: 53 or more minutes  Treatment plan will be reviewed in 90 days or when goals have been changed.       MeasurableTreatment Goal(s) related to diagnosis / functional impairment(s)  Goal 1: Patient will engage in activities to support wellbeing and improved mood     I will know I've met my goal when .        Objective #B  Patient will Increase interest, engagement, and pleasure in doing things  Decrease frequency and intensity of feeling down, depressed, hopeless  Improve quantity and quality of night time sleep / decrease daytime naps  Feel less tired and more energy during the day   Improve diet, appetite, mindful eating, and / or meal planning  Identify negative self-talk and behaviors: challenge core beliefs, myths, and actions  Improve concentration, focus, and mindfulness in daily activities   Feel less fidgety, restless or slow in daily activities / interpersonal interactions.  Status: Continued - Date(s): May 8,2024    Intervention(s)  Therapist will  teach skills, use MI and assign homework .      Patient has reviewed and agreed to the above plan.      Yadira Etienne, Northern Light A.R. Gould HospitalSW  May 8,2024

## 2024-09-11 ENCOUNTER — HOSPITAL ENCOUNTER (OUTPATIENT)
Dept: MAMMOGRAPHY | Facility: CLINIC | Age: 64
Discharge: HOME | End: 2024-09-11
Attending: INTERNAL MEDICINE | Admitting: INTERNAL MEDICINE
Payer: MEDICARE

## 2024-09-11 DIAGNOSIS — Z12.31 VISIT FOR SCREENING MAMMOGRAM: ICD-10-CM

## 2024-09-11 PROCEDURE — 77063 BREAST TOMOSYNTHESIS BI: CPT

## 2024-09-18 ENCOUNTER — VIRTUAL VISIT (OUTPATIENT)
Dept: FAMILY MEDICINE | Facility: CLINIC | Age: 64
End: 2024-09-18
Payer: MEDICARE

## 2024-09-18 DIAGNOSIS — E11.9 TYPE 2 DIABETES MELLITUS WITHOUT COMPLICATION, WITHOUT LONG-TERM CURRENT USE OF INSULIN (H): Primary | ICD-10-CM

## 2024-09-18 DIAGNOSIS — F41.1 GAD (GENERALIZED ANXIETY DISORDER): ICD-10-CM

## 2024-09-18 DIAGNOSIS — I10 PRIMARY HYPERTENSION: ICD-10-CM

## 2024-09-18 DIAGNOSIS — E78.5 HYPERLIPIDEMIA LDL GOAL <70: ICD-10-CM

## 2024-09-18 DIAGNOSIS — F33.1 MODERATE EPISODE OF RECURRENT MAJOR DEPRESSIVE DISORDER (H): ICD-10-CM

## 2024-09-18 PROCEDURE — 99443 PR PHYSICIAN TELEPHONE EVALUATION 21-30 MIN: CPT | Mod: 95 | Performed by: INTERNAL MEDICINE

## 2024-09-18 RX ORDER — CITALOPRAM HYDROBROMIDE 40 MG/1
40 TABLET ORAL DAILY
Qty: 90 TABLET | Refills: 3 | Status: SHIPPED | OUTPATIENT
Start: 2024-09-18

## 2024-09-18 RX ORDER — LISINOPRIL/HYDROCHLOROTHIAZIDE 10-12.5 MG
1 TABLET ORAL DAILY
Qty: 90 TABLET | Refills: 0 | Status: SHIPPED | OUTPATIENT
Start: 2024-09-18

## 2024-09-18 RX ORDER — METFORMIN HCL 500 MG
500 TABLET, EXTENDED RELEASE 24 HR ORAL
Qty: 90 TABLET | Refills: 0 | Status: SHIPPED | OUTPATIENT
Start: 2024-09-18

## 2024-09-18 RX ORDER — ATORVASTATIN CALCIUM 10 MG/1
10 TABLET, FILM COATED ORAL DAILY
Qty: 90 TABLET | Refills: 0 | Status: SHIPPED | OUTPATIENT
Start: 2024-09-18

## 2024-09-18 ASSESSMENT — PATIENT HEALTH QUESTIONNAIRE - PHQ9: SUM OF ALL RESPONSES TO PHQ QUESTIONS 1-9: 0

## 2024-09-18 NOTE — PROGRESS NOTES
Eli is a 63 year old who is being evaluated via a billable telephone visit.    How would you like to obtain your AVS? MyChart  Originating Location (pt. Location): Home    Distant Location (provider location):  On-site      Subjective   Eli is a 63 year old, presenting for the following health issues:  Recheck Medication    Via the Health Maintenance questionnaire, the patient has reported the following services have been completed -Mammogram: [facility] 2024-09-11, this information has not been sent to the abstraction team.    History of Present Illness       Reason for visit:  Citalopram refill.   She is taking medications regularly.       Current Medications:     Current Outpatient Medications   Medication Sig Dispense Refill    atorvastatin (LIPITOR) 10 MG tablet Take 1 tablet (10 mg) by mouth daily. 90 tablet 0    blood glucose (NO BRAND SPECIFIED) test strip Use to test blood sugar 2 times a week. To accompany: Blood Glucose Monitor Brands: per insurance. 100 strip 6    blood glucose monitoring (NO BRAND SPECIFIED) meter device kit Use to test blood sugar 2 times weekly. Preferred blood glucose meter OR supplies to accompany: Blood Glucose Monitor Brands: per insurance. 1 kit 0    citalopram (CELEXA) 40 MG tablet Take 1 tablet (40 mg) by mouth daily. 90 tablet 3    IBANdronate (BONIVA) 150 MG tablet TAKE 1 TABLET BY MOUTH EVERY 30 DAYS 12 tablet 0    levothyroxine (SYNTHROID/LEVOTHROID) 100 MCG tablet TAKE 1 TABLET(100 MCG) BY MOUTH DAILY 90 tablet 0    lisinopril-hydrochlorothiazide (ZESTORETIC) 10-12.5 MG tablet Take 1 tablet by mouth daily. 90 tablet 0    metFORMIN (GLUCOPHAGE XR) 500 MG 24 hr tablet Take 1 tablet (500 mg) by mouth daily (with dinner). 90 tablet 0    thin (NO BRAND SPECIFIED) lancets Use with lanceting device. To accompany: Blood Glucose Monitor Brands: per insurance. 100 each 6         Allergies:      Allergies   Allergen Reactions    Sulfa Antibiotics              Past Medical History:     Past Medical History:   Diagnosis Date    Depressive disorder     Depressive disorder, not elsewhere classified     Diabetes (H)     Hypertension     Unspecified hypothyroidism          Past Surgical History:     Past Surgical History:   Procedure Laterality Date    BIOPSY      Bladder    EYE SURGERY      Sclera Buckle surgery    GYN SURGERY  2000    Cervical Dysplasia         Family Medical History:     Family History   Problem Relation Age of Onset    Anxiety Disorder Mother     Mental Illness Mother     Parkinsonism Mother     Diabetes Father     Other Cancer Father         Liver    Hyperlipidemia Brother     Depression No family hx of          Social History:     Social History     Socioeconomic History    Marital status:      Spouse name: Not on file    Number of children: Not on file    Years of education: Not on file    Highest education level: Not on file   Occupational History    Occupation: retired   Tobacco Use    Smoking status: Former     Current packs/day: 0.00     Average packs/day: 1.5 packs/day for 13.0 years (19.5 ttl pk-yrs)     Types: Cigarettes     Start date: 1975     Quit date: 1988     Years since quittin.7    Smokeless tobacco: Never   Vaping Use    Vaping status: Never Used   Substance and Sexual Activity    Alcohol use: Yes     Comment: rarely 1 drink per week    Drug use: Not Currently     Types: Marijuana, LSD, PCP     Comment: Used as a teenager, quit all drugs at 14.    Sexual activity: Yes     Partners: Male     Birth control/protection: Post-menopausal   Other Topics Concern    Parent/sibling w/ CABG, MI or angioplasty before 65F 55M? No   Social History Narrative    Social Documentation:        Balanced Diet: YES    Calcium intake: milk and food per day    Caffeine: 20 ozs per day    Exercise:  type of activity walk;  5 times per week    Sunscreen: No -     Seatbelts:  Yes    Self Breast Exam:  Yes    Self  Testicular Exam: No - na    Physical/Emotional/Sexual Abuse: No -     Do you feel safe in your environment? Yes        Cholesterol screen up to date: No - due    Eye Exam up to date: Yes    Dental Exam up to date: Yes    Pap smear up to date: Yes    Mammogram up to date: No: due    Dexa Scan up to date: no need    Colonoscopy up to date: No:     Immunizations up to date: Yes    Glucose screen if over 40:  Yes         Social Determinants of Health     Financial Resource Strain: Not on file   Food Insecurity: Not on file   Transportation Needs: Not on file   Physical Activity: Not on file   Stress: Not on file   Social Connections: Not on file   Interpersonal Safety: Low Risk  (9/18/2024)    Interpersonal Safety     Do you feel physically and emotionally safe where you currently live?: Yes     Within the past 12 months, have you been hit, slapped, kicked or otherwise physically hurt by someone?: No     Within the past 12 months, have you been humiliated or emotionally abused in other ways by your partner or ex-partner?: No   Housing Stability: Not on file           Review of System:     Constitutional: Negative for fever or chills  Skin: Negative for rashes  Ears/Nose/Throat: Negative for nasal congestion, sore throat  Respiratory: No shortness of breath, dyspnea on exertion, cough, or hemoptysis  Cardiovascular: Negative for chest pain  Gastrointestinal: Negative for nausea, vomiting  Genitourinary: Negative for dysuria, hematuria  Musculoskeletal: Negative for myalgias  Neurologic: Negative for headaches  Psychiatric: positive for depression, anxiety  Hematologic/Lymphatic/Immunologic: Negative  Endocrine: Negative  Behavioral: Negative for tobacco use       Physical Exam:   LMP  (LMP Unknown)   Breastfeeding No     RESP: no cough present   NEURO: Alert & Oriented x 3.   PSYCH: mentation appears normal, affect normal        Diagnostic Test Results:     Diagnostic Test Results:  Labs reviewed in  Epic    ASSESSMENT/PLAN:       Eli was seen today for recheck medication.    Diagnoses and all orders for this visit:    Type 2 diabetes mellitus without complication, without long-term current use of insulin (H)  - stable, medication refilled today for    metFORMIN (GLUCOPHAGE XR) 500 MG 24 hr tablet; Take 1 tablet (500 mg) by mouth daily (with dinner).    NENA (generalized anxiety disorder)  - stable, medication refilled today for     citalopram (CELEXA) 40 MG tablet; Take 1 tablet (40 mg) by mouth daily.    Moderate episode of recurrent major depressive disorder (H)  - stable, medication refilled today for      citalopram (CELEXA) 40 MG tablet; Take 1 tablet (40 mg) by mouth daily.    Hyperlipidemia LDL goal <70  - stable, medication refilled today for      atorvastatin (LIPITOR) 10 MG tablet; Take 1 tablet (10 mg) by mouth daily.    Primary hypertension  - stable, medication refilled today for     lisinopril-hydrochlorothiazide (ZESTORETIC) 10-12.5 MG tablet; Take 1 tablet by mouth daily.    Other orders  -     REVIEW OF HEALTH MAINTENANCE PROTOCOL ORDERS            Follow Up Plan:     Patient is instructed to return to Internal Medicine clinic for follow-up visit in 3 months.        Bridgett Mckenna MD  Internal Medicine  [facility]      Telephone Visit duration including chart review medication management: 22 minutes    Signed Electronically by: Bridgett Mckenna MD

## 2024-10-17 ENCOUNTER — MYC REFILL (OUTPATIENT)
Dept: FAMILY MEDICINE | Facility: CLINIC | Age: 64
End: 2024-10-17
Payer: MEDICARE

## 2024-10-17 DIAGNOSIS — E03.9 HYPOTHYROIDISM, UNSPECIFIED TYPE: ICD-10-CM

## 2024-10-18 RX ORDER — LEVOTHYROXINE SODIUM 100 UG/1
100 TABLET ORAL DAILY
Qty: 90 TABLET | Refills: 0 | OUTPATIENT
Start: 2024-10-18

## 2024-10-18 RX ORDER — LEVOTHYROXINE SODIUM 100 UG/1
100 TABLET ORAL DAILY
Qty: 90 TABLET | Refills: 0 | Status: SHIPPED | OUTPATIENT
Start: 2024-10-18

## 2024-12-21 ENCOUNTER — HEALTH MAINTENANCE LETTER (OUTPATIENT)
Age: 64
End: 2024-12-21

## 2025-01-02 DIAGNOSIS — I10 PRIMARY HYPERTENSION: ICD-10-CM

## 2025-01-02 DIAGNOSIS — E11.9 TYPE 2 DIABETES MELLITUS WITHOUT COMPLICATION, WITHOUT LONG-TERM CURRENT USE OF INSULIN (H): ICD-10-CM

## 2025-01-02 RX ORDER — METFORMIN HYDROCHLORIDE 500 MG/1
500 TABLET, EXTENDED RELEASE ORAL
Qty: 90 TABLET | Refills: 0 | Status: SHIPPED | OUTPATIENT
Start: 2025-01-02

## 2025-01-02 RX ORDER — LISINOPRIL AND HYDROCHLOROTHIAZIDE 10; 12.5 MG/1; MG/1
1 TABLET ORAL DAILY
Qty: 90 TABLET | Refills: 0 | Status: SHIPPED | OUTPATIENT
Start: 2025-01-02

## 2025-01-21 ENCOUNTER — MYC REFILL (OUTPATIENT)
Dept: FAMILY MEDICINE | Facility: CLINIC | Age: 65
End: 2025-01-21
Payer: MEDICARE

## 2025-01-21 DIAGNOSIS — E03.9 HYPOTHYROIDISM, UNSPECIFIED TYPE: ICD-10-CM

## 2025-01-22 RX ORDER — LEVOTHYROXINE SODIUM 100 UG/1
100 TABLET ORAL DAILY
Qty: 90 TABLET | Refills: 0 | OUTPATIENT
Start: 2025-01-22

## 2025-03-09 SDOH — HEALTH STABILITY: PHYSICAL HEALTH: ON AVERAGE, HOW MANY DAYS PER WEEK DO YOU ENGAGE IN MODERATE TO STRENUOUS EXERCISE (LIKE A BRISK WALK)?: 1 DAY

## 2025-03-09 SDOH — HEALTH STABILITY: PHYSICAL HEALTH: ON AVERAGE, HOW MANY MINUTES DO YOU ENGAGE IN EXERCISE AT THIS LEVEL?: 40 MIN

## 2025-03-09 ASSESSMENT — SOCIAL DETERMINANTS OF HEALTH (SDOH): HOW OFTEN DO YOU GET TOGETHER WITH FRIENDS OR RELATIVES?: ONCE A WEEK

## 2025-03-13 ASSESSMENT — PATIENT HEALTH QUESTIONNAIRE - PHQ9
10. IF YOU CHECKED OFF ANY PROBLEMS, HOW DIFFICULT HAVE THESE PROBLEMS MADE IT FOR YOU TO DO YOUR WORK, TAKE CARE OF THINGS AT HOME, OR GET ALONG WITH OTHER PEOPLE: NOT DIFFICULT AT ALL
SUM OF ALL RESPONSES TO PHQ QUESTIONS 1-9: 2
SUM OF ALL RESPONSES TO PHQ QUESTIONS 1-9: 2

## 2025-03-14 ENCOUNTER — OFFICE VISIT (OUTPATIENT)
Dept: FAMILY MEDICINE | Facility: CLINIC | Age: 65
End: 2025-03-14
Payer: COMMERCIAL

## 2025-03-14 VITALS
OXYGEN SATURATION: 94 % | BODY MASS INDEX: 38.16 KG/M2 | SYSTOLIC BLOOD PRESSURE: 134 MMHG | RESPIRATION RATE: 20 BRPM | WEIGHT: 223.5 LBS | TEMPERATURE: 96.9 F | HEIGHT: 64 IN | HEART RATE: 96 BPM | DIASTOLIC BLOOD PRESSURE: 81 MMHG

## 2025-03-14 DIAGNOSIS — M81.6 LOCALIZED OSTEOPOROSIS WITHOUT CURRENT PATHOLOGICAL FRACTURE: ICD-10-CM

## 2025-03-14 DIAGNOSIS — Z12.11 SCREEN FOR COLON CANCER: ICD-10-CM

## 2025-03-14 DIAGNOSIS — Z11.59 NEED FOR HEPATITIS C SCREENING TEST: ICD-10-CM

## 2025-03-14 DIAGNOSIS — E78.5 HYPERLIPIDEMIA LDL GOAL <70: ICD-10-CM

## 2025-03-14 DIAGNOSIS — Z00.00 ANNUAL PHYSICAL EXAM: Primary | ICD-10-CM

## 2025-03-14 DIAGNOSIS — E66.01 CLASS 2 SEVERE OBESITY DUE TO EXCESS CALORIES WITH SERIOUS COMORBIDITY AND BODY MASS INDEX (BMI) OF 38.0 TO 38.9 IN ADULT (H): ICD-10-CM

## 2025-03-14 DIAGNOSIS — F33.1 MODERATE EPISODE OF RECURRENT MAJOR DEPRESSIVE DISORDER (H): ICD-10-CM

## 2025-03-14 DIAGNOSIS — E03.9 HYPOTHYROIDISM, UNSPECIFIED TYPE: ICD-10-CM

## 2025-03-14 DIAGNOSIS — E66.812 CLASS 2 SEVERE OBESITY DUE TO EXCESS CALORIES WITH SERIOUS COMORBIDITY AND BODY MASS INDEX (BMI) OF 38.0 TO 38.9 IN ADULT (H): ICD-10-CM

## 2025-03-14 DIAGNOSIS — E11.9 TYPE 2 DIABETES MELLITUS WITHOUT COMPLICATION, WITHOUT LONG-TERM CURRENT USE OF INSULIN (H): ICD-10-CM

## 2025-03-14 DIAGNOSIS — I10 PRIMARY HYPERTENSION: ICD-10-CM

## 2025-03-14 LAB
ERYTHROCYTE [DISTWIDTH] IN BLOOD BY AUTOMATED COUNT: 13.4 % (ref 10–15)
EST. AVERAGE GLUCOSE BLD GHB EST-MCNC: 131 MG/DL
HBA1C MFR BLD: 6.2 % (ref 0–5.6)
HCT VFR BLD AUTO: 48.4 % (ref 35–47)
HGB BLD-MCNC: 15.8 G/DL (ref 11.7–15.7)
MCH RBC QN AUTO: 29.7 PG (ref 26.5–33)
MCHC RBC AUTO-ENTMCNC: 32.6 G/DL (ref 31.5–36.5)
MCV RBC AUTO: 91 FL (ref 78–100)
PLATELET # BLD AUTO: 130 10E3/UL (ref 150–450)
RBC # BLD AUTO: 5.32 10E6/UL (ref 3.8–5.2)
WBC # BLD AUTO: 8.7 10E3/UL (ref 4–11)

## 2025-03-14 PROCEDURE — G0438 PPPS, INITIAL VISIT: HCPCS

## 2025-03-14 PROCEDURE — 84443 ASSAY THYROID STIM HORMONE: CPT

## 2025-03-14 PROCEDURE — 85027 COMPLETE CBC AUTOMATED: CPT

## 2025-03-14 PROCEDURE — 3075F SYST BP GE 130 - 139MM HG: CPT

## 2025-03-14 PROCEDURE — 1126F AMNT PAIN NOTED NONE PRSNT: CPT

## 2025-03-14 PROCEDURE — G2211 COMPLEX E/M VISIT ADD ON: HCPCS

## 2025-03-14 PROCEDURE — 86803 HEPATITIS C AB TEST: CPT

## 2025-03-14 PROCEDURE — 80061 LIPID PANEL: CPT

## 2025-03-14 PROCEDURE — 3079F DIAST BP 80-89 MM HG: CPT

## 2025-03-14 PROCEDURE — 83036 HEMOGLOBIN GLYCOSYLATED A1C: CPT

## 2025-03-14 PROCEDURE — 80053 COMPREHEN METABOLIC PANEL: CPT

## 2025-03-14 PROCEDURE — 99214 OFFICE O/P EST MOD 30 MIN: CPT | Mod: 25

## 2025-03-14 PROCEDURE — 36415 COLL VENOUS BLD VENIPUNCTURE: CPT

## 2025-03-14 RX ORDER — LISINOPRIL AND HYDROCHLOROTHIAZIDE 10; 12.5 MG/1; MG/1
1 TABLET ORAL DAILY
Qty: 90 TABLET | Refills: 3 | Status: SHIPPED | OUTPATIENT
Start: 2025-03-14

## 2025-03-14 RX ORDER — LEVOTHYROXINE SODIUM 100 UG/1
100 TABLET ORAL DAILY
Qty: 90 TABLET | Refills: 3 | Status: SHIPPED | OUTPATIENT
Start: 2025-03-14

## 2025-03-14 RX ORDER — METFORMIN HYDROCHLORIDE 500 MG/1
500 TABLET, EXTENDED RELEASE ORAL
Qty: 90 TABLET | Refills: 3 | Status: SHIPPED | OUTPATIENT
Start: 2025-03-14

## 2025-03-14 RX ORDER — ATORVASTATIN CALCIUM 10 MG/1
10 TABLET, FILM COATED ORAL DAILY
Qty: 90 TABLET | Refills: 3 | Status: SHIPPED | OUTPATIENT
Start: 2025-03-14

## 2025-03-14 ASSESSMENT — PAIN SCALES - GENERAL: PAINLEVEL_OUTOF10: NO PAIN (0)

## 2025-03-14 NOTE — PROGRESS NOTES
Answers submitted by the patient for this visit:  Patient Health Questionnaire (Submitted on 3/13/2025)  If you checked off any problems, how difficult have these problems made it for you to do your work, take care of things at home, or get along with other people?: Not difficult at all  PHQ9 TOTAL SCORE: 2  Preventive Care Visit  [facility] ELENITA Finley DNP, Geriatric Medicine  Mar 14, 2025      Assessment & Plan     Annual physical exam  UTD on mammogram, due for colon cancer screening. Will update labs today, plans to update vaccines at pharmacy.  - Adult Dermatology  Referral; Future    Moderate episode of recurrent major depressive disorder (H)  Stable on Celexa, will continue    Class 2 severe obesity due to excess calories with serious comorbidity and body mass index (BMI) of 38.0 to 38.9 in adult (H)  Contributing to T2DM, HTN, HLD. Continue with healthy lifestyle modifications to aid in weight loss. Hoping to incorporate more strength training. Interested in starting Mounjaro for DM management and weight loss    Type 2 diabetes mellitus without complication, without long-term current use of insulin (H)  A1c at goal, due for annual labs. Continue Metformin, add Mounjaro for further BG control as well as aid in weight loss. Discussed mechanism of action, side effects, dosing schedule, and contraindications of medication. Patient denies personal or family hx of medullary thyroid cancer and MEN syndrome. Expected weight loss goals discussed. Discussed about continuing a healthy diet and regular exercise. Continue to eat a protein and fiber rich diet and drink plenty of water. Continue annual eye exams. Follow-up in 3 months  - metFORMIN (GLUCOPHAGE XR) 500 MG 24 hr tablet; Take 1 tablet (500 mg) by mouth daily (with dinner).  - tirzepatide (MOUNJARO) 2.5 MG/0.5ML SOAJ auto-injector pen; Inject 0.5 mLs (2.5 mg) subcutaneously once a week.  - tirzepatide (MOUNJARO) 5 MG/0.5ML SOAJ  auto-injector pen; Inject 0.5 mLs (5 mg) subcutaneously once a week.  - HEMOGLOBIN A1C  - Lipid panel reflex to direct LDL Non-fasting  - Albumin Random Urine Quantitative with Creat Ratio  - Comprehensive metabolic panel    Hypothyroidism, unspecified type  Check labs for stability. Continue levothyroxine  - levothyroxine (SYNTHROID/LEVOTHROID) 100 MCG tablet; Take 1 tablet (100 mcg) by mouth daily.  - TSH WITH FREE T4 REFLEX    Primary hypertension  Stable on lisinopril-hydrochlorothiazide, continue. Check labs for stability.   - lisinopril-hydrochlorothiazide (ZESTORETIC) 10-12.5 MG tablet; Take 1 tablet by mouth daily.  - tirzepatide (MOUNJARO) 2.5 MG/0.5ML SOAJ auto-injector pen; Inject 0.5 mLs (2.5 mg) subcutaneously once a week.  - tirzepatide (MOUNJARO) 5 MG/0.5ML SOAJ auto-injector pen; Inject 0.5 mLs (5 mg) subcutaneously once a week.  - CBC with platelets  - Comprehensive metabolic panel    Localized osteoporosis without current pathological fracture  Hx of osteoporosis, on Boniva (unclear amount of time - believes ~5 years but will need to request records).  Due for repeat DEXA  - DEXA HIP/PELVIS/SPINE - Future; Future    Hyperlipidemia LDL goal <70  Stable on atorvastatin, continue. Check labs for stability.   - atorvastatin (LIPITOR) 10 MG tablet; Take 1 tablet (10 mg) by mouth daily.  - tirzepatide (MOUNJARO) 2.5 MG/0.5ML SOAJ auto-injector pen; Inject 0.5 mLs (2.5 mg) subcutaneously once a week.  - tirzepatide (MOUNJARO) 5 MG/0.5ML SOAJ auto-injector pen; Inject 0.5 mLs (5 mg) subcutaneously once a week.    Screen for colon cancer  Declines colonoscopy for screening, prefers annual FIT test so will provide  - Fecal colorectal cancer screen (FIT)    Need for hepatitis C screening test  - Hepatitis C Screen Reflex to HCV RNA Quant and Genotype    Patient has been advised of split billing requirements and indicates understanding: Yes        BMI  Estimated body mass index is 38.07 kg/m  as calculated  "from the following:    Height as of this encounter: 1.632 m (5' 4.25\").    Weight as of this encounter: 101.4 kg (223 lb 8 oz).   Weight management plan: Discussed healthy diet and exercise guidelines    Counseling  Appropriate preventive services were addressed with this patient via screening, questionnaire, or discussion as appropriate for fall prevention, nutrition, physical activity, Tobacco-use cessation, social engagement, weight loss and cognition.  Checklist reviewing preventive services available has been given to the patient.  Reviewed patient's diet, addressing concerns and/or questions.   She is at risk for lack of exercise and has been provided with information to increase physical activity for the benefit of her well-being.   The patient was instructed to see the dentist every 6 months.   She is at risk for psychosocial distress and has been provided with information to reduce risk.   Updated plan of care.  Patient reported difficulty with activities of daily living were addressed today.    MEDICATIONS:        - Start taking Mounjaro       - Continue other medications without change  FUTURE APPOINTMENTS:       - Follow-up visit in 3 months       - Follow-up for annual visit or as needed  Work on weight loss  Regular exercise    Rose Benitez is a 64 year old, presenting for the following:  No chief complaint on file.        3/14/2025     1:33 PM   Additional Questions   Roomed by Fran HILTON MA   Accompanied by Sarkis            Here for physical  Moved to [place] from [place] with  to be closer to family. Primary caregiver for  who has hx of glioblastoma   Had a bladder biopsy many years ago, will still have some blood in the urine at times - was told this was normal for what they found               Advance Care Planning  Patient does not have a Health Care Directive: Discussed advance care planning with patient; information given to patient to review.      3/9/2025   General " Health   How would you rate your overall physical health? Good   Feel stress (tense, anxious, or unable to sleep) To some extent   (!) STRESS CONCERN      3/9/2025   Nutrition   Diet: Regular (no restrictions)         3/9/2025   Exercise   Days per week of moderate/strenous exercise 1 day   Average minutes spent exercising at this level 40 min   (!) EXERCISE CONCERN      3/9/2025   Social Factors   Frequency of gathering with friends or relatives Once a week   Worry food won't last until get money to buy more No   Food not last or not have enough money for food? No   Do you have housing? (Housing is defined as stable permanent housing and does not include staying ouside in a car, in a tent, in an abandoned building, in an overnight shelter, or couch-surfing.) Yes   Are you worried about losing your housing? Yes   Lack of transportation? Yes   Unable to get utilities (heat,electricity)? No   Want help with housing or utility concern? No    (!) TRANSPORTATION CONCERN PRESENT(!) HOUSING CONCERN PRESENT      3/9/2025   Fall Risk   Fallen 2 or more times in the past year? No   Trouble with walking or balance? No          3/9/2025   Activities of Daily Living- Home Safety   Needs help with the following daily activites Transportation   Safety concerns in the home None of the above         3/9/2025   Dental   Dentist two times every year? (!) NO         3/9/2025   Hearing Screening   Hearing concerns? None of the above         3/9/2025   Driving Risk Screening   Patient/family members have concerns about driving No         3/9/2025   General Alertness/Fatigue Screening   Have you been more tired than usual lately? No         3/9/2025   Urinary Incontinence Screening   Bothered by leaking urine in past 6 months No         Today's PHQ-9 Score:       3/13/2025     2:19 PM   PHQ-9 SCORE   PHQ-9 Total Score MyChart 2 (Minimal depression)   PHQ-9 Total Score 2        Patient-reported         3/9/2025   Substance Use   Alcohol  more than 3/day or more than 7/wk No   Do you have a current opioid prescription? No   How severe/bad is pain from 1 to 10? 0/10 (No Pain)   Do you use any other substances recreationally? No     Social History     Tobacco Use    Smoking status: Former     Current packs/day: 0.00     Average packs/day: 1.5 packs/day for 13.0 years (19.5 ttl pk-yrs)     Types: Cigarettes     Start date: 1975     Quit date: 1988     Years since quittin.2    Smokeless tobacco: Never   Vaping Use    Vaping status: Never Used   Substance Use Topics    Alcohol use: Yes     Comment: rarely 1 drink per week    Drug use: Not Currently     Types: Marijuana, LSD, PCP     Comment: Used as a teenager, quit all drugs at 14.           2024   LAST FHS-7 RESULTS   1st degree relative breast or ovarian cancer No   Any relative bilateral breast cancer No   Any male have breast cancer No   Any ONE woman have BOTH breast AND ovarian cancer No   Any woman with breast cancer before 50yrs No   2 or more relatives with breast AND/OR ovarian cancer No   2 or more relatives with breast AND/OR bowel cancer No        Mammogram Screening - Mammogram every 1-2 years updated in Health Maintenance based on mutual decision making      History of abnormal Pap smear: No - age 65 or older with adequate negative prior screening test results (3 consecutive negative cytology results, 2 consecutive negative cotesting results, or 2 consecutive negative HrHPV test results within 10 years, with the most recent test occurring within the recommended screening interval for the test used)        2006    12:00 AM   PAP / HPV   PAP (Historical) NIL      ASCVD Risk   The 10-year ASCVD risk score (Lydia QUINN, et al., 2019) is: 15.7%    Values used to calculate the score:      Age: 64 years      Sex: Female      Is Non- : No      Diabetic: Yes      Tobacco smoker: No      Systolic Blood Pressure: 134 mmHg      Is BP treated: Yes       HDL Cholesterol: 57 mg/dL      Total Cholesterol: 177 mg/dL            Reviewed and updated as needed this visit by Provider   Tobacco     Med Hx  Surg Hx  Fam Hx  Soc Hx Sexual Activity          Past Medical History:   Diagnosis Date    Depressive disorder 1976    Depressive disorder, not elsewhere classified     Diabetes (H) 2022    Hyperlipidemia LDL goal <70     Hypertension 2010    Osteoporosis     Pulmonary nodules     reports had stable CT scan f/up    Unspecified hypothyroidism      Past Surgical History:   Procedure Laterality Date    BIOPSY  2015    Bladder    EYE SURGERY  1996    Sclera Buckle surgery    GYN SURGERY  2000    Cervical Dysplasia     Lab work is in process  Current providers sharing in care for this patient include:  Patient Care Team:  Lisa Vanegas MD as PCP - Yadira Givens LICSW as Assigned Behavioral Health Provider  Bridgett Mckenna MD as Assigned PCP    The following health maintenance items are reviewed in Epic and correct as of today:  Health Maintenance   Topic Date Due    BMP  Never done    DEXA  Never done    MICROALBUMIN  Never done    DIABETIC FOOT EXAM  Never done    ADVANCE CARE PLANNING  Never done    DEPRESSION ACTION PLAN  Never done    EYE EXAM  Never done    COLORECTAL CANCER SCREENING  Never done    HEPATITIS C SCREENING  Never done    DTAP/TDAP/TD IMMUNIZATION (1 - Tdap) 06/21/2000    ZOSTER IMMUNIZATION (1 of 2) Never done    RSV VACCINE (1 - Risk 60-74 years 1-dose series) Never done    A1C  09/27/2023    MEDICARE ANNUAL WELLNESS VISIT  10/22/2023    LIPID  06/27/2024    TSH W/FREE T4 REFLEX  06/27/2024    COVID-19 Vaccine (3 - 2024-25 season) 03/20/2025    PHQ-9  09/14/2025    ANNUAL REVIEW OF HM ORDERS  09/18/2025    FALL RISK ASSESSMENT  03/14/2026    MAMMO SCREENING  09/11/2026    INFLUENZA VACCINE  Completed    Pneumococcal Vaccine: 50+ Years  Completed    HPV IMMUNIZATION  Aged Out    MENINGITIS IMMUNIZATION  Aged Out    PAP  Discontinued  "        Review of Systems  Constitutional, HEENT, cardiovascular, pulmonary, gi and gu systems are negative, except as otherwise noted.     Objective    Exam  /81 (BP Location: Left arm, Patient Position: Sitting, Cuff Size: Adult Large)   Pulse 96   Temp 96.9  F (36.1  C) (Temporal)   Resp 20   Ht 1.632 m (5' 4.25\")   Wt 101.4 kg (223 lb 8 oz)   LMP  (LMP Unknown)   SpO2 94%   BMI 38.07 kg/m     Estimated body mass index is 38.07 kg/m  as calculated from the following:    Height as of this encounter: 1.632 m (5' 4.25\").    Weight as of this encounter: 101.4 kg (223 lb 8 oz).    Physical Exam  Vitals reviewed.   HENT:      Head: Normocephalic.   Eyes:      Pupils: Pupils are equal, round, and reactive to light.   Cardiovascular:      Rate and Rhythm: Normal rate and regular rhythm.      Pulses: Normal pulses.      Heart sounds: Normal heart sounds. No murmur heard.  Pulmonary:      Effort: Pulmonary effort is normal. No respiratory distress.      Breath sounds: Normal breath sounds. No wheezing, rhonchi or rales.   Abdominal:      General: Bowel sounds are normal. There is no distension.      Palpations: Abdomen is soft. There is no mass.      Tenderness: There is no abdominal tenderness.   Musculoskeletal:         General: Normal range of motion.      Cervical back: Normal range of motion and neck supple.      Right lower leg: No edema.      Left lower leg: No edema.   Skin:     General: Skin is warm and dry.   Neurological:      Mental Status: She is alert and oriented to person, place, and time.   Psychiatric:         Mood and Affect: Mood normal.         Behavior: Behavior normal.               3/14/2025   Mini Cog   Clock Draw Score 2 Normal   3 Item Recall 3 objects recalled   Mini Cog Total Score 5            Signed Electronically by: Eliane Finley, DNP, APRN, CNP    "

## 2025-03-15 PROBLEM — E66.01 CLASS 2 SEVERE OBESITY DUE TO EXCESS CALORIES WITH SERIOUS COMORBIDITY AND BODY MASS INDEX (BMI) OF 38.0 TO 38.9 IN ADULT (H): Status: ACTIVE | Noted: 2025-03-15

## 2025-03-15 PROBLEM — E66.812 CLASS 2 SEVERE OBESITY DUE TO EXCESS CALORIES WITH SERIOUS COMORBIDITY AND BODY MASS INDEX (BMI) OF 38.0 TO 38.9 IN ADULT (H): Status: ACTIVE | Noted: 2025-03-15

## 2025-03-15 LAB — HCV AB SERPL QL IA: NONREACTIVE

## 2025-03-16 LAB
CHOLEST SERPL-MCNC: 168 MG/DL
FASTING STATUS PATIENT QL REPORTED: NO
HDLC SERPL-MCNC: 57 MG/DL
LDLC SERPL CALC-MCNC: 84 MG/DL
NONHDLC SERPL-MCNC: 111 MG/DL
TRIGL SERPL-MCNC: 133 MG/DL
TSH SERPL DL<=0.005 MIU/L-ACNC: 3.19 UIU/ML (ref 0.3–4.2)

## 2025-03-17 ENCOUNTER — PATIENT OUTREACH (OUTPATIENT)
Dept: CARE COORDINATION | Facility: CLINIC | Age: 65
End: 2025-03-17
Payer: COMMERCIAL

## 2025-03-17 LAB
ALBUMIN SERPL BCG-MCNC: 4.2 G/DL (ref 3.5–5.2)
ALP SERPL-CCNC: 103 U/L (ref 40–150)
ALT SERPL W P-5'-P-CCNC: 34 U/L (ref 0–50)
ANION GAP SERPL CALCULATED.3IONS-SCNC: 13 MMOL/L (ref 7–15)
AST SERPL W P-5'-P-CCNC: 36 U/L (ref 0–45)
BILIRUB SERPL-MCNC: 0.5 MG/DL
BUN SERPL-MCNC: 14 MG/DL (ref 8–23)
CALCIUM SERPL-MCNC: 9.8 MG/DL (ref 8.8–10.4)
CHLORIDE SERPL-SCNC: 104 MMOL/L (ref 98–107)
CREAT SERPL-MCNC: 0.96 MG/DL (ref 0.51–0.95)
EGFRCR SERPLBLD CKD-EPI 2021: 65 ML/MIN/1.73M2
FASTING STATUS PATIENT QL REPORTED: NO
GLUCOSE SERPL-MCNC: 118 MG/DL (ref 70–99)
HCO3 SERPL-SCNC: 22 MMOL/L (ref 22–29)
POTASSIUM SERPL-SCNC: 4.5 MMOL/L (ref 3.4–5.3)
PROT SERPL-MCNC: 8.3 G/DL (ref 6.4–8.3)
SODIUM SERPL-SCNC: 139 MMOL/L (ref 135–145)

## 2025-05-15 ENCOUNTER — PATIENT OUTREACH (OUTPATIENT)
Dept: CARE COORDINATION | Facility: CLINIC | Age: 65
End: 2025-05-15
Payer: COMMERCIAL

## 2025-06-03 DIAGNOSIS — E78.5 HYPERLIPIDEMIA LDL GOAL <70: ICD-10-CM

## 2025-06-03 DIAGNOSIS — I10 PRIMARY HYPERTENSION: ICD-10-CM

## 2025-06-03 DIAGNOSIS — E11.9 TYPE 2 DIABETES MELLITUS WITHOUT COMPLICATION, WITHOUT LONG-TERM CURRENT USE OF INSULIN (H): ICD-10-CM

## 2025-06-03 RX ORDER — TIRZEPATIDE 5 MG/.5ML
INJECTION, SOLUTION SUBCUTANEOUS
Qty: 2 ML | Refills: 1 | Status: SHIPPED | OUTPATIENT
Start: 2025-06-03

## 2025-06-24 ENCOUNTER — OFFICE VISIT (OUTPATIENT)
Dept: FAMILY MEDICINE | Facility: CLINIC | Age: 65
End: 2025-06-24
Payer: COMMERCIAL

## 2025-06-24 ENCOUNTER — RESULTS FOLLOW-UP (OUTPATIENT)
Dept: FAMILY MEDICINE | Facility: CLINIC | Age: 65
End: 2025-06-24

## 2025-06-24 VITALS
TEMPERATURE: 97.3 F | WEIGHT: 205.6 LBS | HEIGHT: 64 IN | BODY MASS INDEX: 35.1 KG/M2 | HEART RATE: 78 BPM | OXYGEN SATURATION: 97 % | RESPIRATION RATE: 18 BRPM | DIASTOLIC BLOOD PRESSURE: 74 MMHG | SYSTOLIC BLOOD PRESSURE: 112 MMHG

## 2025-06-24 DIAGNOSIS — E78.5 HYPERLIPIDEMIA LDL GOAL <70: ICD-10-CM

## 2025-06-24 DIAGNOSIS — I10 PRIMARY HYPERTENSION: ICD-10-CM

## 2025-06-24 DIAGNOSIS — M81.6 LOCALIZED OSTEOPOROSIS WITHOUT CURRENT PATHOLOGICAL FRACTURE: ICD-10-CM

## 2025-06-24 DIAGNOSIS — E11.9 TYPE 2 DIABETES MELLITUS WITHOUT COMPLICATION, WITHOUT LONG-TERM CURRENT USE OF INSULIN (H): ICD-10-CM

## 2025-06-24 DIAGNOSIS — E66.01 CLASS 2 SEVERE OBESITY DUE TO EXCESS CALORIES WITH SERIOUS COMORBIDITY AND BODY MASS INDEX (BMI) OF 38.0 TO 38.9 IN ADULT (H): Primary | ICD-10-CM

## 2025-06-24 DIAGNOSIS — E66.812 CLASS 2 SEVERE OBESITY DUE TO EXCESS CALORIES WITH SERIOUS COMORBIDITY AND BODY MASS INDEX (BMI) OF 38.0 TO 38.9 IN ADULT (H): Primary | ICD-10-CM

## 2025-06-24 LAB
EST. AVERAGE GLUCOSE BLD GHB EST-MCNC: 114 MG/DL
HBA1C MFR BLD: 5.6 % (ref 0–5.6)

## 2025-06-24 PROCEDURE — 3074F SYST BP LT 130 MM HG: CPT

## 2025-06-24 PROCEDURE — 3044F HG A1C LEVEL LT 7.0%: CPT

## 2025-06-24 PROCEDURE — 83036 HEMOGLOBIN GLYCOSYLATED A1C: CPT

## 2025-06-24 PROCEDURE — 99214 OFFICE O/P EST MOD 30 MIN: CPT

## 2025-06-24 PROCEDURE — 3078F DIAST BP <80 MM HG: CPT

## 2025-06-24 PROCEDURE — G2211 COMPLEX E/M VISIT ADD ON: HCPCS

## 2025-06-24 PROCEDURE — 36415 COLL VENOUS BLD VENIPUNCTURE: CPT

## 2025-06-24 RX ORDER — TIRZEPATIDE 5 MG/.5ML
5 INJECTION, SOLUTION SUBCUTANEOUS WEEKLY
Qty: 2 ML | Refills: 2 | Status: SHIPPED | OUTPATIENT
Start: 2025-06-24

## 2025-06-24 NOTE — PROGRESS NOTES
Assessment & Plan     Class 2 severe obesity due to excess calories with serious comorbidity and body mass index (BMI) of 38.0 to 38.9 in adult (H)  Contributing to T2DM, HTN, and HLD. Reports ~20# weight loss since starting Mounjaro and is pleased with progress. Having minimal SE on current dose of Mounjaro so would like to continue 5 mg/week dosing for now. Will advise ifs she would like to increase. Continue to incorporate regular physical activity and 80g of protein/day. Follow-up within 3 months to reassess.    Type 2 diabetes mellitus without complication, without long-term current use of insulin (H)  A1c improved (6.2 --> 5.6) since starting Mounjaro, tolerating well and would like to continue with current dose which is reasonable given response. Foot exam done today. Due for eye exam. Continue Metformin for now, could consider decreasing to minimize pill burden.  - Adult Eye  Referral; Future  - tirzepatide (MOUNJARO) 5 MG/0.5ML SOAJ auto-injector pen; Inject 0.5 mLs (5 mg) subcutaneously once a week.  - HEMOGLOBIN A1C  - Albumin Random Urine Quantitative with Creat Ratio    Primary hypertension  Improved since weight loss, continue lisinopril-hydrochlorothiazide   - tirzepatide (MOUNJARO) 5 MG/0.5ML SOAJ auto-injector pen; Inject 0.5 mLs (5 mg) subcutaneously once a week.    Hyperlipidemia LDL goal <70  Continue statin and lifestyle changes to support healthy cholesterol levels.  - tirzepatide (MOUNJARO) 5 MG/0.5ML SOAJ auto-injector pen; Inject 0.5 mLs (5 mg) subcutaneously once a week.    Localized osteoporosis without current pathological fracture  On Boniva, however notes has been inconsistent the past couple months. Recommend updating DEXA scan with plan to take drug holiday. Patient will call to schedule.            Follow-up   3 months or as needed    Subjective   Eli is a 64 year old, presenting for the following health issues:  Follow Up (Last OV 3/14/2025 ) and Diabetes  Repeat labs  "  Fasting   Pt is doing well on the Mounjaro       History of Present Illness       Diabetes:   She presents for follow up of diabetes.    She is not checking blood glucose.         She has no concerns regarding her diabetes at this time.   She is not experiencing numbness or burning in feet, excessive thirst, blurry vision, weight changes or redness, sores or blisters on feet. The patient has not had a diabetic eye exam in the last 12 months.          She eats 2-3 servings of fruits and vegetables daily.She consumes 0 sweetened beverage(s) daily.She exercises with enough effort to increase her heart rate 10 to 19 minutes per day.  She exercises with enough effort to increase her heart rate 3 or less days per week. She is missing 1 dose(s) of medications per week.  She is not taking prescribed medications regularly due to remembering to take.                  Review of Systems  Constitutional, HEENT, cardiovascular, pulmonary, gi and gu systems are negative, except as otherwise noted.      Objective    /74   Pulse 78   Temp 97.3  F (36.3  C) (Temporal)   Resp 18   Ht 1.631 m (5' 4.2\")   Wt 93.3 kg (205 lb 9.6 oz)   LMP  (LMP Unknown)   SpO2 97%   BMI 35.07 kg/m    Body mass index is 35.07 kg/m .  Physical Exam  Vitals reviewed.   Constitutional:       Appearance: Normal appearance.   HENT:      Head: Normocephalic.   Eyes:      Pupils: Pupils are equal, round, and reactive to light.   Cardiovascular:      Rate and Rhythm: Normal rate.      Pulses:           Dorsalis pedis pulses are 2+ on the right side and 2+ on the left side.   Pulmonary:      Effort: Pulmonary effort is normal. No respiratory distress.   Musculoskeletal:         General: Normal range of motion.      Right foot: No deformity.      Left foot: No deformity.   Feet:      Right foot:      Protective Sensation: 10 sites tested.  10 sites sensed.      Skin integrity: Skin integrity normal.      Toenail Condition: Right toenails are normal. "      Left foot:      Protective Sensation: 10 sites tested.  10 sites sensed.      Skin integrity: Skin integrity normal.      Toenail Condition: Left toenails are normal.   Skin:     General: Skin is warm.   Neurological:      Mental Status: She is alert and oriented to person, place, and time.   Psychiatric:         Mood and Affect: Mood normal.         Behavior: Behavior normal.         Thought Content: Thought content normal.         Judgment: Judgment normal.            Office Visit on 03/14/2025   Component Date Value Ref Range Status    Estimated Average Glucose 03/14/2025 131 (H)  <117 mg/dL Final    Hemoglobin A1C 03/14/2025 6.2 (H)  0.0 - 5.6 % Final    Normal <5.7%   Prediabetes 5.7-6.4%    Diabetes 6.5% or higher     Note: Adopted from ADA consensus guidelines.    Cholesterol 03/14/2025 168  <200 mg/dL Final    Triglycerides 03/14/2025 133  <150 mg/dL Final    Direct Measure HDL 03/14/2025 57  >=50 mg/dL Final    LDL Cholesterol Calculated 03/14/2025 84  <100 mg/dL Final    Non HDL Cholesterol 03/14/2025 111  <130 mg/dL Final    Patient Fasting > 8hrs? 03/14/2025 No   Final    TSH 03/14/2025 3.19  0.30 - 4.20 uIU/mL Final    Hepatitis C Antibody 03/14/2025 Nonreactive  Nonreactive Final    A nonreactive screening test result does not exclude the possibility of exposure to or infection with HCV. Nonreactive screening test results in individuals with prior exposure to HCV may be due to antibody levels below the limit of detection of this assay or lack of reactivity to the HCV antigens used in this assay. Patients with recent HCV infections (<3 months from time of exposure) may have false-negative HCV antibody results due to the time needed for seroconversion (average of 8 to 9 weeks).    WBC Count 03/14/2025 8.7  4.0 - 11.0 10e3/uL Final    RBC Count 03/14/2025 5.32 (H)  3.80 - 5.20 10e6/uL Final    Hemoglobin 03/14/2025 15.8 (H)  11.7 - 15.7 g/dL Final    Hematocrit 03/14/2025 48.4 (H)  35.0 - 47.0 %  Final    MCV 03/14/2025 91  78 - 100 fL Final    MCH 03/14/2025 29.7  26.5 - 33.0 pg Final    MCHC 03/14/2025 32.6  31.5 - 36.5 g/dL Final    RDW 03/14/2025 13.4  10.0 - 15.0 % Final    Platelet Count 03/14/2025 130 (L)  150 - 450 10e3/uL Final    Sodium 03/14/2025 139  135 - 145 mmol/L Final    Potassium 03/14/2025 4.5  3.4 - 5.3 mmol/L Final    Carbon Dioxide (CO2) 03/14/2025 22  22 - 29 mmol/L Final    Anion Gap 03/14/2025 13  7 - 15 mmol/L Final    Urea Nitrogen 03/14/2025 14.0  8.0 - 23.0 mg/dL Final    Creatinine 03/14/2025 0.96 (H)  0.51 - 0.95 mg/dL Final    GFR Estimate 03/14/2025 65  >60 mL/min/1.73m2 Final    eGFR calculated using 2021 CKD-EPI equation.    Calcium 03/14/2025 9.8  8.8 - 10.4 mg/dL Final    Chloride 03/14/2025 104  98 - 107 mmol/L Final    Glucose 03/14/2025 118 (H)  70 - 99 mg/dL Final    Alkaline Phosphatase 03/14/2025 103  40 - 150 U/L Final    AST 03/14/2025 36  0 - 45 U/L Final    ALT 03/14/2025 34  0 - 50 U/L Final    Protein Total 03/14/2025 8.3  6.4 - 8.3 g/dL Final    Albumin 03/14/2025 4.2  3.5 - 5.2 g/dL Final    Bilirubin Total 03/14/2025 0.5  <=1.2 mg/dL Final    Patient Fasting > 8hrs? 03/14/2025 No   Final    Occult Blood Screen FIT 05/06/2025 Negative  Negative Final     Results for orders placed or performed in visit on 06/24/25 (from the past 24 hours)   HEMOGLOBIN A1C   Result Value Ref Range    Estimated Average Glucose 114 <117 mg/dL    Hemoglobin A1C 5.6 0.0 - 5.6 %           Signed Electronically by: Eliane Finley, DNP, APRN, CNP

## 2025-06-25 ENCOUNTER — PATIENT OUTREACH (OUTPATIENT)
Dept: CARE COORDINATION | Facility: CLINIC | Age: 65
End: 2025-06-25
Payer: COMMERCIAL

## 2025-07-03 LAB
CREAT UR-MCNC: 65.2 MG/DL
MICROALBUMIN UR-MCNC: <12 MG/L
MICROALBUMIN/CREAT UR: NORMAL MG/G{CREAT}

## 2025-07-16 ENCOUNTER — E-VISIT (OUTPATIENT)
Dept: FAMILY MEDICINE | Facility: CLINIC | Age: 65
End: 2025-07-16
Payer: COMMERCIAL

## 2025-07-16 ENCOUNTER — TELEPHONE (OUTPATIENT)
Dept: OTHER | Facility: CLINIC | Age: 65
End: 2025-07-16

## 2025-07-16 DIAGNOSIS — Z82.49 FAMILY HISTORY OF ABDOMINAL AORTIC ANEURYSM (AAA): ICD-10-CM

## 2025-07-16 DIAGNOSIS — Z13.6 SCREENING FOR AAA (ABDOMINAL AORTIC ANEURYSM): Primary | ICD-10-CM

## 2025-07-16 NOTE — TELEPHONE ENCOUNTER
Provider E-Visit time total (minutes): 5 minutes        Patient message notes mother and brother both had abdominal aortic aneurysm rupture, recommend screening

## 2025-07-16 NOTE — TELEPHONE ENCOUNTER
Referral received via [employer] on 7/16/25.    Referred by Eliane Finley DNP for family hx of AAA with rupture (mother and brother), interested in follow-up     Routing to scheduling to coordinate the following:  NEW VASCULAR PATIENT consult with Vascular Medicine  Please schedule this at next available    Appt note: Referred by Eliane Finley DNP for family hx of AAA with rupture (mother and brother), interested in follow-up     Adrianna Queen RN  [facility]  Office: [phone]  Fax: [phone]

## 2025-08-08 ENCOUNTER — MYC MEDICAL ADVICE (OUTPATIENT)
Dept: FAMILY MEDICINE | Facility: CLINIC | Age: 65
End: 2025-08-08
Payer: COMMERCIAL

## 2025-08-08 DIAGNOSIS — E78.5 HYPERLIPIDEMIA LDL GOAL <70: ICD-10-CM

## 2025-08-08 DIAGNOSIS — I10 PRIMARY HYPERTENSION: Primary | ICD-10-CM

## 2025-08-08 DIAGNOSIS — E11.9 TYPE 2 DIABETES MELLITUS WITHOUT COMPLICATION, WITHOUT LONG-TERM CURRENT USE OF INSULIN (H): ICD-10-CM

## 2025-08-20 ENCOUNTER — HOSPITAL ENCOUNTER (OUTPATIENT)
Dept: BONE DENSITY | Facility: CLINIC | Age: 65
Discharge: HOME | End: 2025-08-20
Payer: COMMERCIAL

## 2025-08-20 DIAGNOSIS — M81.6 LOCALIZED OSTEOPOROSIS WITHOUT CURRENT PATHOLOGICAL FRACTURE: ICD-10-CM

## 2025-08-20 PROCEDURE — 77080 DXA BONE DENSITY AXIAL: CPT

## 2025-08-22 DIAGNOSIS — F41.1 GAD (GENERALIZED ANXIETY DISORDER): ICD-10-CM

## 2025-08-22 DIAGNOSIS — F33.1 MODERATE EPISODE OF RECURRENT MAJOR DEPRESSIVE DISORDER (H): ICD-10-CM

## 2025-08-25 RX ORDER — CITALOPRAM HYDROBROMIDE 40 MG/1
40 TABLET ORAL DAILY
Qty: 90 TABLET | Refills: 3 | Status: SHIPPED | OUTPATIENT
Start: 2025-08-25 | End: 2025-08-25

## 2025-08-25 RX ORDER — CITALOPRAM HYDROBROMIDE 40 MG/1
40 TABLET ORAL DAILY
Qty: 90 TABLET | Refills: 3 | Status: SHIPPED | OUTPATIENT
Start: 2025-08-25

## 2025-08-27 ENCOUNTER — OFFICE VISIT (OUTPATIENT)
Dept: OTHER | Facility: CLINIC | Age: 65
End: 2025-08-27
Attending: INTERNAL MEDICINE
Payer: COMMERCIAL

## 2025-08-27 VITALS
WEIGHT: 193 LBS | HEART RATE: 83 BPM | OXYGEN SATURATION: 96 % | BODY MASS INDEX: 32.92 KG/M2 | DIASTOLIC BLOOD PRESSURE: 84 MMHG | SYSTOLIC BLOOD PRESSURE: 126 MMHG

## 2025-08-27 DIAGNOSIS — I10 ESSENTIAL HYPERTENSION: ICD-10-CM

## 2025-08-27 DIAGNOSIS — E78.5 HYPERLIPIDEMIA LDL GOAL <70: ICD-10-CM

## 2025-08-27 DIAGNOSIS — Z87.891 FORMER TOBACCO USE: ICD-10-CM

## 2025-08-27 DIAGNOSIS — Z82.49 FAMILY HISTORY OF ABDOMINAL AORTIC ANEURYSM (AAA): Primary | ICD-10-CM

## 2025-08-27 DIAGNOSIS — E11.9 DM TYPE 2, GOAL HBA1C < 7% (H): ICD-10-CM

## 2025-08-27 DIAGNOSIS — R09.89 OTHER SPECIFIED SYMPTOMS AND SIGNS INVOLVING THE CIRCULATORY AND RESPIRATORY SYSTEMS: ICD-10-CM

## 2025-08-27 PROCEDURE — G0463 HOSPITAL OUTPT CLINIC VISIT: HCPCS | Performed by: INTERNAL MEDICINE

## 2025-08-28 ENCOUNTER — TELEPHONE (OUTPATIENT)
Dept: OTHER | Facility: CLINIC | Age: 65
End: 2025-08-28
Payer: COMMERCIAL

## 2025-08-28 DIAGNOSIS — E78.5 HYPERLIPIDEMIA LDL GOAL <70: ICD-10-CM

## 2025-08-28 DIAGNOSIS — I10 ESSENTIAL HYPERTENSION: ICD-10-CM

## 2025-08-28 DIAGNOSIS — E11.9 DM TYPE 2, GOAL HBA1C < 7% (H): ICD-10-CM

## 2025-08-28 DIAGNOSIS — Z82.49 FAMILY HISTORY OF ABDOMINAL AORTIC ANEURYSM (AAA): Primary | ICD-10-CM
